# Patient Record
Sex: MALE | Race: BLACK OR AFRICAN AMERICAN | NOT HISPANIC OR LATINO | URBAN - METROPOLITAN AREA
[De-identification: names, ages, dates, MRNs, and addresses within clinical notes are randomized per-mention and may not be internally consistent; named-entity substitution may affect disease eponyms.]

---

## 2018-05-05 ENCOUNTER — EMERGENCY (EMERGENCY)
Facility: HOSPITAL | Age: 33
LOS: 1 days | Discharge: HOME | End: 2018-05-05
Admitting: EMERGENCY MEDICINE
Payer: COMMERCIAL

## 2018-05-05 VITALS
HEART RATE: 87 BPM | TEMPERATURE: 98 F | OXYGEN SATURATION: 100 % | RESPIRATION RATE: 16 BRPM | DIASTOLIC BLOOD PRESSURE: 76 MMHG | SYSTOLIC BLOOD PRESSURE: 129 MMHG

## 2018-05-05 VITALS
SYSTOLIC BLOOD PRESSURE: 123 MMHG | HEART RATE: 70 BPM | DIASTOLIC BLOOD PRESSURE: 80 MMHG | TEMPERATURE: 98 F | RESPIRATION RATE: 18 BRPM | OXYGEN SATURATION: 100 %

## 2018-05-05 LAB
ALBUMIN SERPL ELPH-MCNC: 3.7 G/DL — SIGNIFICANT CHANGE UP (ref 3.3–5)
ALP SERPL-CCNC: 159 U/L — HIGH (ref 40–120)
ALT FLD-CCNC: 58 U/L — HIGH (ref 4–41)
AST SERPL-CCNC: 37 U/L — SIGNIFICANT CHANGE UP (ref 4–40)
BASOPHILS # BLD AUTO: 0.1 K/UL — SIGNIFICANT CHANGE UP (ref 0–0.2)
BASOPHILS NFR BLD AUTO: 0.8 % — SIGNIFICANT CHANGE UP (ref 0–2)
BILIRUB SERPL-MCNC: < 0.2 MG/DL — LOW (ref 0.2–1.2)
BUN SERPL-MCNC: 13 MG/DL — SIGNIFICANT CHANGE UP (ref 7–23)
CALCIUM SERPL-MCNC: 9.3 MG/DL — SIGNIFICANT CHANGE UP (ref 8.4–10.5)
CHLORIDE SERPL-SCNC: 102 MMOL/L — SIGNIFICANT CHANGE UP (ref 98–107)
CO2 SERPL-SCNC: 27 MMOL/L — SIGNIFICANT CHANGE UP (ref 22–31)
CREAT SERPL-MCNC: 1.11 MG/DL — SIGNIFICANT CHANGE UP (ref 0.5–1.3)
EOSINOPHIL # BLD AUTO: 0.88 K/UL — HIGH (ref 0–0.5)
EOSINOPHIL NFR BLD AUTO: 7.4 % — HIGH (ref 0–6)
GLUCOSE SERPL-MCNC: 96 MG/DL — SIGNIFICANT CHANGE UP (ref 70–99)
HCT VFR BLD CALC: 42.6 % — SIGNIFICANT CHANGE UP (ref 39–50)
HGB BLD-MCNC: 13.8 G/DL — SIGNIFICANT CHANGE UP (ref 13–17)
IMM GRANULOCYTES # BLD AUTO: 0.18 # — SIGNIFICANT CHANGE UP
IMM GRANULOCYTES NFR BLD AUTO: 1.5 % — SIGNIFICANT CHANGE UP (ref 0–1.5)
LYMPHOCYTES # BLD AUTO: 36.2 % — SIGNIFICANT CHANGE UP (ref 13–44)
LYMPHOCYTES # BLD AUTO: 4.3 K/UL — HIGH (ref 1–3.3)
MCHC RBC-ENTMCNC: 25.2 PG — LOW (ref 27–34)
MCHC RBC-ENTMCNC: 32.4 % — SIGNIFICANT CHANGE UP (ref 32–36)
MCV RBC AUTO: 77.9 FL — LOW (ref 80–100)
MONOCYTES # BLD AUTO: 0.75 K/UL — SIGNIFICANT CHANGE UP (ref 0–0.9)
MONOCYTES NFR BLD AUTO: 6.3 % — SIGNIFICANT CHANGE UP (ref 2–14)
NEUTROPHILS # BLD AUTO: 5.67 K/UL — SIGNIFICANT CHANGE UP (ref 1.8–7.4)
NEUTROPHILS NFR BLD AUTO: 47.8 % — SIGNIFICANT CHANGE UP (ref 43–77)
NRBC # FLD: 0 — SIGNIFICANT CHANGE UP
OB PNL STL: NEGATIVE — SIGNIFICANT CHANGE UP
PLATELET # BLD AUTO: 270 K/UL — SIGNIFICANT CHANGE UP (ref 150–400)
PMV BLD: 10 FL — SIGNIFICANT CHANGE UP (ref 7–13)
POTASSIUM SERPL-MCNC: 4.4 MMOL/L — SIGNIFICANT CHANGE UP (ref 3.5–5.3)
POTASSIUM SERPL-SCNC: 4.4 MMOL/L — SIGNIFICANT CHANGE UP (ref 3.5–5.3)
PROT SERPL-MCNC: 6.8 G/DL — SIGNIFICANT CHANGE UP (ref 6–8.3)
RBC # BLD: 5.47 M/UL — SIGNIFICANT CHANGE UP (ref 4.2–5.8)
RBC # FLD: 14.5 % — SIGNIFICANT CHANGE UP (ref 10.3–14.5)
SODIUM SERPL-SCNC: 141 MMOL/L — SIGNIFICANT CHANGE UP (ref 135–145)
WBC # BLD: 11.88 K/UL — HIGH (ref 3.8–10.5)
WBC # FLD AUTO: 11.88 K/UL — HIGH (ref 3.8–10.5)

## 2018-05-05 PROCEDURE — 99284 EMERGENCY DEPT VISIT MOD MDM: CPT

## 2018-05-05 PROCEDURE — 74177 CT ABD & PELVIS W/CONTRAST: CPT | Mod: 26

## 2018-05-05 RX ORDER — OMEPRAZOLE 10 MG/1
0 CAPSULE, DELAYED RELEASE ORAL
Qty: 0 | Refills: 0 | COMMUNITY

## 2018-05-05 RX ORDER — SODIUM CHLORIDE 9 MG/ML
1000 INJECTION INTRAMUSCULAR; INTRAVENOUS; SUBCUTANEOUS ONCE
Qty: 0 | Refills: 0 | Status: COMPLETED | OUTPATIENT
Start: 2018-05-05 | End: 2018-05-05

## 2018-05-05 RX ADMIN — SODIUM CHLORIDE 1000 MILLILITER(S): 9 INJECTION INTRAMUSCULAR; INTRAVENOUS; SUBCUTANEOUS at 19:10

## 2018-05-05 NOTE — ED ADULT NURSE NOTE - CHIEF COMPLAINT
The patient is a 32y Male complaining of The patient is a 32y Male complaining of abdominal pain and rectal bleeding

## 2018-05-05 NOTE — ED ADULT NURSE NOTE - CHIEF COMPLAINT QUOTE
Pt c/o rectal Bleed , abd discomfort, weakness since yesterday and dizziness today.  pt had a similar episode of dizziness a few weeks ago with low BP

## 2018-05-05 NOTE — ED PROVIDER NOTE - OBJECTIVE STATEMENT
33 y/o male no pmh c/o abd pain in epigastric to left side of adb saw GI for abd pain and is to have endoscopy on 5/14. He developed rectal bleeding yesterday and states blood is on his stool and in toilet bowl. Denies fever/chills, n/v/d, rectal pain, hemorrhoids. States he has been feeling lightheaded this afternoon.

## 2019-03-15 ENCOUNTER — EMERGENCY (EMERGENCY)
Facility: HOSPITAL | Age: 34
LOS: 1 days | Discharge: HOME | End: 2019-03-15
Attending: EMERGENCY MEDICINE | Admitting: EMERGENCY MEDICINE
Payer: COMMERCIAL

## 2019-03-15 VITALS
OXYGEN SATURATION: 97 % | RESPIRATION RATE: 26 BRPM | HEART RATE: 117 BPM | TEMPERATURE: 101 F | SYSTOLIC BLOOD PRESSURE: 133 MMHG | DIASTOLIC BLOOD PRESSURE: 77 MMHG

## 2019-03-15 VITALS
RESPIRATION RATE: 18 BRPM | OXYGEN SATURATION: 100 % | HEART RATE: 91 BPM | TEMPERATURE: 99 F | SYSTOLIC BLOOD PRESSURE: 108 MMHG | DIASTOLIC BLOOD PRESSURE: 54 MMHG

## 2019-03-15 LAB
ALBUMIN SERPL ELPH-MCNC: 3.8 G/DL — SIGNIFICANT CHANGE UP (ref 3.3–5)
ALP SERPL-CCNC: 150 U/L — HIGH (ref 40–120)
ALT FLD-CCNC: 25 U/L — SIGNIFICANT CHANGE UP (ref 4–41)
ANION GAP SERPL CALC-SCNC: 15 MMO/L — HIGH (ref 7–14)
APPEARANCE UR: CLEAR — SIGNIFICANT CHANGE UP
AST SERPL-CCNC: 21 U/L — SIGNIFICANT CHANGE UP (ref 4–40)
BASE EXCESS BLDV CALC-SCNC: 4.1 MMOL/L — SIGNIFICANT CHANGE UP
BASOPHILS # BLD AUTO: 0.05 K/UL — SIGNIFICANT CHANGE UP (ref 0–0.2)
BASOPHILS NFR BLD AUTO: 0.5 % — SIGNIFICANT CHANGE UP (ref 0–2)
BILIRUB SERPL-MCNC: 0.4 MG/DL — SIGNIFICANT CHANGE UP (ref 0.2–1.2)
BILIRUB UR-MCNC: NEGATIVE — SIGNIFICANT CHANGE UP
BLOOD GAS VENOUS - CREATININE: 0.96 MG/DL — SIGNIFICANT CHANGE UP (ref 0.5–1.3)
BLOOD UR QL VISUAL: NEGATIVE — SIGNIFICANT CHANGE UP
BUN SERPL-MCNC: 12 MG/DL — SIGNIFICANT CHANGE UP (ref 7–23)
CALCIUM SERPL-MCNC: 9.1 MG/DL — SIGNIFICANT CHANGE UP (ref 8.4–10.5)
CHLORIDE BLDV-SCNC: 108 MMOL/L — SIGNIFICANT CHANGE UP (ref 96–108)
CHLORIDE SERPL-SCNC: 101 MMOL/L — SIGNIFICANT CHANGE UP (ref 98–107)
CO2 SERPL-SCNC: 22 MMOL/L — SIGNIFICANT CHANGE UP (ref 22–31)
COLOR SPEC: YELLOW — SIGNIFICANT CHANGE UP
CREAT SERPL-MCNC: 0.99 MG/DL — SIGNIFICANT CHANGE UP (ref 0.5–1.3)
EOSINOPHIL # BLD AUTO: 0.13 K/UL — SIGNIFICANT CHANGE UP (ref 0–0.5)
EOSINOPHIL NFR BLD AUTO: 1.2 % — SIGNIFICANT CHANGE UP (ref 0–6)
GAS PNL BLDV: 133 MMOL/L — LOW (ref 136–146)
GLUCOSE BLDV-MCNC: 113 — HIGH (ref 70–99)
GLUCOSE SERPL-MCNC: 107 MG/DL — HIGH (ref 70–99)
GLUCOSE UR-MCNC: NEGATIVE — SIGNIFICANT CHANGE UP
HCO3 BLDV-SCNC: 27 MMOL/L — SIGNIFICANT CHANGE UP (ref 20–27)
HCT VFR BLD CALC: 46.9 % — SIGNIFICANT CHANGE UP (ref 39–50)
HCT VFR BLDV CALC: 45.9 % — SIGNIFICANT CHANGE UP (ref 39–51)
HGB BLD-MCNC: 14.2 G/DL — SIGNIFICANT CHANGE UP (ref 13–17)
HGB BLDV-MCNC: 15 G/DL — SIGNIFICANT CHANGE UP (ref 13–17)
IMM GRANULOCYTES NFR BLD AUTO: 1 % — SIGNIFICANT CHANGE UP (ref 0–1.5)
KETONES UR-MCNC: NEGATIVE — SIGNIFICANT CHANGE UP
LACTATE BLDV-MCNC: 1.7 MMOL/L — SIGNIFICANT CHANGE UP (ref 0.5–2)
LEUKOCYTE ESTERASE UR-ACNC: NEGATIVE — SIGNIFICANT CHANGE UP
LIDOCAIN IGE QN: 19.6 U/L — SIGNIFICANT CHANGE UP (ref 7–60)
LYMPHOCYTES # BLD AUTO: 2.25 K/UL — SIGNIFICANT CHANGE UP (ref 1–3.3)
LYMPHOCYTES # BLD AUTO: 21.6 % — SIGNIFICANT CHANGE UP (ref 13–44)
MCHC RBC-ENTMCNC: 23.6 PG — LOW (ref 27–34)
MCHC RBC-ENTMCNC: 30.3 % — LOW (ref 32–36)
MCV RBC AUTO: 77.9 FL — LOW (ref 80–100)
MONOCYTES # BLD AUTO: 0.83 K/UL — SIGNIFICANT CHANGE UP (ref 0–0.9)
MONOCYTES NFR BLD AUTO: 8 % — SIGNIFICANT CHANGE UP (ref 2–14)
NEUTROPHILS # BLD AUTO: 7.06 K/UL — SIGNIFICANT CHANGE UP (ref 1.8–7.4)
NEUTROPHILS NFR BLD AUTO: 67.7 % — SIGNIFICANT CHANGE UP (ref 43–77)
NITRITE UR-MCNC: NEGATIVE — SIGNIFICANT CHANGE UP
NRBC # FLD: 0 K/UL — LOW (ref 25–125)
PCO2 BLDV: 46 MMHG — SIGNIFICANT CHANGE UP (ref 41–51)
PH BLDV: 7.41 PH — SIGNIFICANT CHANGE UP (ref 7.32–7.43)
PH UR: 6 — SIGNIFICANT CHANGE UP (ref 5–8)
PLATELET # BLD AUTO: 280 K/UL — SIGNIFICANT CHANGE UP (ref 150–400)
PMV BLD: 9.7 FL — SIGNIFICANT CHANGE UP (ref 7–13)
PO2 BLDV: 39 MMHG — SIGNIFICANT CHANGE UP (ref 35–40)
POTASSIUM BLDV-SCNC: 3.4 MMOL/L — SIGNIFICANT CHANGE UP (ref 3.4–4.5)
POTASSIUM SERPL-MCNC: 3.7 MMOL/L — SIGNIFICANT CHANGE UP (ref 3.5–5.3)
POTASSIUM SERPL-SCNC: 3.7 MMOL/L — SIGNIFICANT CHANGE UP (ref 3.5–5.3)
PROT SERPL-MCNC: 7.4 G/DL — SIGNIFICANT CHANGE UP (ref 6–8.3)
PROT UR-MCNC: 30 — SIGNIFICANT CHANGE UP
RBC # BLD: 6.02 M/UL — HIGH (ref 4.2–5.8)
RBC # FLD: 14.3 % — SIGNIFICANT CHANGE UP (ref 10.3–14.5)
SAO2 % BLDV: 71.2 % — SIGNIFICANT CHANGE UP (ref 60–85)
SODIUM SERPL-SCNC: 138 MMOL/L — SIGNIFICANT CHANGE UP (ref 135–145)
SP GR SPEC: 1.02 — SIGNIFICANT CHANGE UP (ref 1–1.04)
URATE SERPL-MCNC: 8.9 MG/DL — HIGH (ref 3.4–8.8)
UROBILINOGEN FLD QL: NORMAL — SIGNIFICANT CHANGE UP
WBC # BLD: 10.42 K/UL — SIGNIFICANT CHANGE UP (ref 3.8–10.5)
WBC # FLD AUTO: 10.42 K/UL — SIGNIFICANT CHANGE UP (ref 3.8–10.5)
WBC UR QL: SIGNIFICANT CHANGE UP (ref 0–?)

## 2019-03-15 PROCEDURE — 73610 X-RAY EXAM OF ANKLE: CPT | Mod: 26,RT

## 2019-03-15 PROCEDURE — 99284 EMERGENCY DEPT VISIT MOD MDM: CPT

## 2019-03-15 RX ORDER — COLCHICINE 0.6 MG
1 TABLET ORAL
Qty: 16 | Refills: 0 | OUTPATIENT
Start: 2019-03-15 | End: 2019-03-18

## 2019-03-15 RX ORDER — FAMOTIDINE 10 MG/ML
1 INJECTION INTRAVENOUS
Qty: 14 | Refills: 0 | OUTPATIENT
Start: 2019-03-15 | End: 2019-03-28

## 2019-03-15 RX ORDER — ACETAMINOPHEN 500 MG
1000 TABLET ORAL ONCE
Qty: 0 | Refills: 0 | Status: DISCONTINUED | OUTPATIENT
Start: 2019-03-15 | End: 2019-03-15

## 2019-03-15 RX ORDER — ONDANSETRON 8 MG/1
1 TABLET, FILM COATED ORAL
Qty: 15 | Refills: 0 | OUTPATIENT
Start: 2019-03-15 | End: 2019-03-19

## 2019-03-15 RX ORDER — SODIUM CHLORIDE 9 MG/ML
2000 INJECTION INTRAMUSCULAR; INTRAVENOUS; SUBCUTANEOUS ONCE
Qty: 0 | Refills: 0 | Status: COMPLETED | OUTPATIENT
Start: 2019-03-15 | End: 2019-03-15

## 2019-03-15 RX ORDER — ONDANSETRON 8 MG/1
4 TABLET, FILM COATED ORAL ONCE
Qty: 0 | Refills: 0 | Status: COMPLETED | OUTPATIENT
Start: 2019-03-15 | End: 2019-03-15

## 2019-03-15 RX ORDER — KETOROLAC TROMETHAMINE 30 MG/ML
30 SYRINGE (ML) INJECTION ONCE
Qty: 0 | Refills: 0 | Status: DISCONTINUED | OUTPATIENT
Start: 2019-03-15 | End: 2019-03-15

## 2019-03-15 RX ORDER — FAMOTIDINE 10 MG/ML
20 INJECTION INTRAVENOUS ONCE
Qty: 0 | Refills: 0 | Status: COMPLETED | OUTPATIENT
Start: 2019-03-15 | End: 2019-03-15

## 2019-03-15 RX ORDER — SODIUM CHLORIDE 9 MG/ML
1000 INJECTION INTRAMUSCULAR; INTRAVENOUS; SUBCUTANEOUS ONCE
Qty: 0 | Refills: 0 | Status: COMPLETED | OUTPATIENT
Start: 2019-03-15 | End: 2019-03-15

## 2019-03-15 RX ADMIN — SODIUM CHLORIDE 1000 MILLILITER(S): 9 INJECTION INTRAMUSCULAR; INTRAVENOUS; SUBCUTANEOUS at 11:11

## 2019-03-15 RX ADMIN — SODIUM CHLORIDE 2000 MILLILITER(S): 9 INJECTION INTRAMUSCULAR; INTRAVENOUS; SUBCUTANEOUS at 14:47

## 2019-03-15 RX ADMIN — Medication 30 MILLIGRAM(S): at 11:10

## 2019-03-15 RX ADMIN — ONDANSETRON 4 MILLIGRAM(S): 8 TABLET, FILM COATED ORAL at 11:10

## 2019-03-15 RX ADMIN — Medication 40 MILLIGRAM(S): at 14:44

## 2019-03-15 RX ADMIN — SODIUM CHLORIDE 1000 MILLILITER(S): 9 INJECTION INTRAMUSCULAR; INTRAVENOUS; SUBCUTANEOUS at 14:44

## 2019-03-15 RX ADMIN — FAMOTIDINE 20 MILLIGRAM(S): 10 INJECTION INTRAVENOUS at 11:10

## 2019-03-15 NOTE — ED PROVIDER NOTE - NSFOLLOWUPINSTRUCTIONS_ED_ALL_ED_FT
Eat a bland diet -  Bananas, rice and toast. Take Zofran as needed every 8 hours for nausea/vomiting. Take Pepcid 40mg once daily in the morning before eating. Take Prednisone 40mg once daily for 5 days for gout flair. Take Colchicine 1 tablet every 6 hours as needed for gout. Please continue all home medications as directed. See your regular doctor/podiatrist within 72 hours for follow-up care. Return to ER for new or worsening symptoms.

## 2019-03-15 NOTE — ED PROVIDER NOTE - PROGRESS NOTE DETAILS
Pt NAD, VSS, no acute complaints. Pt tolerating PO. Discussed the results of the labs and imaging. PE: abdomen - soft, nontender, nondistended.

## 2019-03-15 NOTE — ED ADULT NURSE NOTE - OBJECTIVE STATEMENT
Pt reports multiple medical complaints with abd pain, fever/chills and right ankle pain. Patient appears NAD, pt states he is unable to ambulate d/t pain. Patient's right foot appears swollen. Patient's labs drawn, septic work-up done. #20g placed into right AC. Patient receiving 2L IVF. Patient pending XR.

## 2019-03-15 NOTE — ED PROVIDER NOTE - ATTENDING CONTRIBUTION TO CARE
I agree with the above H&P.  Briefly this is a 33 year old with n/v/d. concern for gastroenteritis. belly soft. will check labs ua lipase. patient with hx of gout will check ua.  ct if focal pain develops or symptoms don't imrpveo with antiemetics

## 2019-03-15 NOTE — ED PROVIDER NOTE - OBJECTIVE STATEMENT
33 year old male with a PMHx of gout pw N/V/D, epigastric pain and fevers since last night. He has had 9 episodes of NB/NB vomiting and 4-5 episodes of nonbloody diarrhea. His wife, child, and other family member living in his home have had similar symptoms during the past week. Traveled to [place] on 2/17. Also states that he ate a taco last night which caused a flair up in his gout on his right ankle and is having difficulty bearing weight on the ankle. Denies CP, SOB, dysuria, hematuria, melena, hematochezia.

## 2019-03-15 NOTE — ED PROVIDER NOTE - NONTENDER LOCATION
suprapubic/periumbilical/left costovertebral angle/left lower quadrant/right costovertebral angle/left upper quadrant/right upper quadrant/right lower quadrant/umbilical

## 2019-03-15 NOTE — ED PROCEDURE NOTE - CPROC ED POST PROC CARE GUIDE1
Elevate the injured extremity as instructed./Instructed patient/caregiver regarding signs and symptoms of infection./Keep the cast/splint/dressing clean and dry./Verbal/written post procedure instructions were given to patient/caregiver./Instructed patient/caregiver to follow-up with primary care physician.

## 2019-03-15 NOTE — ED PROVIDER NOTE - CLINICAL SUMMARY MEDICAL DECISION MAKING FREE TEXT BOX
33 year old male with a PMHx of gout pw N/V/D, epigastric pain and fevers since last night. He has had 9 episodes of NB/NB vomiting and 4-5 episodes of nonbloody diarrhea  Assessment: abdomen nontender - most likely gastroenteritis  Plan: ivf, antiemetics, pain management, labs, UA, xray ankle

## 2019-03-16 LAB
SPECIMEN SOURCE: SIGNIFICANT CHANGE UP

## 2019-03-17 LAB — BACTERIA UR CULT: SIGNIFICANT CHANGE UP

## 2019-03-20 LAB
BACTERIA BLD CULT: SIGNIFICANT CHANGE UP
BACTERIA BLD CULT: SIGNIFICANT CHANGE UP

## 2019-04-29 ENCOUNTER — EMERGENCY (EMERGENCY)
Facility: HOSPITAL | Age: 34
LOS: 1 days | Discharge: HOME | End: 2019-04-29
Attending: EMERGENCY MEDICINE | Admitting: EMERGENCY MEDICINE
Payer: COMMERCIAL

## 2019-04-29 VITALS
TEMPERATURE: 100 F | OXYGEN SATURATION: 98 % | HEART RATE: 135 BPM | RESPIRATION RATE: 20 BRPM | DIASTOLIC BLOOD PRESSURE: 73 MMHG | SYSTOLIC BLOOD PRESSURE: 131 MMHG

## 2019-04-29 LAB
ALBUMIN SERPL ELPH-MCNC: 4.1 G/DL — SIGNIFICANT CHANGE UP (ref 3.3–5)
ALP SERPL-CCNC: 146 U/L — HIGH (ref 40–120)
ALT FLD-CCNC: 22 U/L — SIGNIFICANT CHANGE UP (ref 4–41)
ANION GAP SERPL CALC-SCNC: 13 MMO/L — SIGNIFICANT CHANGE UP (ref 7–14)
APTT BLD: 36 SEC — SIGNIFICANT CHANGE UP (ref 27.5–36.3)
AST SERPL-CCNC: 15 U/L — SIGNIFICANT CHANGE UP (ref 4–40)
BASE EXCESS BLDV CALC-SCNC: 2.4 MMOL/L — SIGNIFICANT CHANGE UP
BASOPHILS # BLD AUTO: 0.1 K/UL — SIGNIFICANT CHANGE UP (ref 0–0.2)
BASOPHILS NFR BLD AUTO: 0.7 % — SIGNIFICANT CHANGE UP (ref 0–2)
BILIRUB SERPL-MCNC: 0.5 MG/DL — SIGNIFICANT CHANGE UP (ref 0.2–1.2)
BLOOD GAS VENOUS - CREATININE: 1.03 MG/DL — SIGNIFICANT CHANGE UP (ref 0.5–1.3)
BUN SERPL-MCNC: 12 MG/DL — SIGNIFICANT CHANGE UP (ref 7–23)
CALCIUM SERPL-MCNC: 9.2 MG/DL — SIGNIFICANT CHANGE UP (ref 8.4–10.5)
CHLORIDE BLDV-SCNC: 103 MMOL/L — SIGNIFICANT CHANGE UP (ref 96–108)
CHLORIDE SERPL-SCNC: 99 MMOL/L — SIGNIFICANT CHANGE UP (ref 98–107)
CK MB BLD-MCNC: < 1 NG/ML — LOW (ref 1–6.6)
CK MB BLD-MCNC: SIGNIFICANT CHANGE UP (ref 0–2.5)
CK SERPL-CCNC: 79 U/L — SIGNIFICANT CHANGE UP (ref 30–200)
CO2 SERPL-SCNC: 25 MMOL/L — SIGNIFICANT CHANGE UP (ref 22–31)
CREAT SERPL-MCNC: 1.21 MG/DL — SIGNIFICANT CHANGE UP (ref 0.5–1.3)
EOSINOPHIL # BLD AUTO: 0.02 K/UL — SIGNIFICANT CHANGE UP (ref 0–0.5)
EOSINOPHIL NFR BLD AUTO: 0.1 % — SIGNIFICANT CHANGE UP (ref 0–6)
GAS PNL BLDV: 136 MMOL/L — SIGNIFICANT CHANGE UP (ref 136–146)
GLUCOSE BLDV-MCNC: 108 — HIGH (ref 70–99)
GLUCOSE SERPL-MCNC: 113 MG/DL — HIGH (ref 70–99)
HCO3 BLDV-SCNC: 26 MMOL/L — SIGNIFICANT CHANGE UP (ref 20–27)
HCT VFR BLD CALC: 44.8 % — SIGNIFICANT CHANGE UP (ref 39–50)
HCT VFR BLDV CALC: 45.2 % — SIGNIFICANT CHANGE UP (ref 39–51)
HGB BLD-MCNC: 14.3 G/DL — SIGNIFICANT CHANGE UP (ref 13–17)
HGB BLDV-MCNC: 14.7 G/DL — SIGNIFICANT CHANGE UP (ref 13–17)
IMM GRANULOCYTES NFR BLD AUTO: 0.8 % — SIGNIFICANT CHANGE UP (ref 0–1.5)
INR BLD: 1.44 — HIGH (ref 0.88–1.17)
LACTATE BLDV-MCNC: 1.2 MMOL/L — SIGNIFICANT CHANGE UP (ref 0.5–2)
LYMPHOCYTES # BLD AUTO: 14.5 % — SIGNIFICANT CHANGE UP (ref 13–44)
LYMPHOCYTES # BLD AUTO: 2.19 K/UL — SIGNIFICANT CHANGE UP (ref 1–3.3)
MCHC RBC-ENTMCNC: 24 PG — LOW (ref 27–34)
MCHC RBC-ENTMCNC: 31.9 % — LOW (ref 32–36)
MCV RBC AUTO: 75.3 FL — LOW (ref 80–100)
MONOCYTES # BLD AUTO: 1.04 K/UL — HIGH (ref 0–0.9)
MONOCYTES NFR BLD AUTO: 6.9 % — SIGNIFICANT CHANGE UP (ref 2–14)
NEUTROPHILS # BLD AUTO: 11.68 K/UL — HIGH (ref 1.8–7.4)
NEUTROPHILS NFR BLD AUTO: 77 % — SIGNIFICANT CHANGE UP (ref 43–77)
NRBC # FLD: 0 K/UL — SIGNIFICANT CHANGE UP (ref 0–0)
PCO2 BLDV: 41 MMHG — SIGNIFICANT CHANGE UP (ref 41–51)
PH BLDV: 7.43 PH — SIGNIFICANT CHANGE UP (ref 7.32–7.43)
PLATELET # BLD AUTO: 291 K/UL — SIGNIFICANT CHANGE UP (ref 150–400)
PMV BLD: 10.1 FL — SIGNIFICANT CHANGE UP (ref 7–13)
PO2 BLDV: 35 MMHG — SIGNIFICANT CHANGE UP (ref 35–40)
POTASSIUM BLDV-SCNC: 3.4 MMOL/L — SIGNIFICANT CHANGE UP (ref 3.4–4.5)
POTASSIUM SERPL-MCNC: 3.5 MMOL/L — SIGNIFICANT CHANGE UP (ref 3.5–5.3)
POTASSIUM SERPL-SCNC: 3.5 MMOL/L — SIGNIFICANT CHANGE UP (ref 3.5–5.3)
PROT SERPL-MCNC: 7.5 G/DL — SIGNIFICANT CHANGE UP (ref 6–8.3)
PROTHROM AB SERPL-ACNC: 16.6 SEC — HIGH (ref 9.8–13.1)
RBC # BLD: 5.95 M/UL — HIGH (ref 4.2–5.8)
RBC # FLD: 14.6 % — HIGH (ref 10.3–14.5)
SAO2 % BLDV: 67.7 % — SIGNIFICANT CHANGE UP (ref 60–85)
SODIUM SERPL-SCNC: 137 MMOL/L — SIGNIFICANT CHANGE UP (ref 135–145)
TROPONIN T, HIGH SENSITIVITY: < 6 NG/L — SIGNIFICANT CHANGE UP (ref ?–14)
WBC # BLD: 15.15 K/UL — HIGH (ref 3.8–10.5)
WBC # FLD AUTO: 15.15 K/UL — HIGH (ref 3.8–10.5)

## 2019-04-29 PROCEDURE — 99284 EMERGENCY DEPT VISIT MOD MDM: CPT | Mod: 25

## 2019-04-29 PROCEDURE — 93010 ELECTROCARDIOGRAM REPORT: CPT

## 2019-04-29 PROCEDURE — 71046 X-RAY EXAM CHEST 2 VIEWS: CPT | Mod: 26

## 2019-04-29 RX ORDER — SODIUM CHLORIDE 9 MG/ML
2000 INJECTION, SOLUTION INTRAVENOUS ONCE
Qty: 0 | Refills: 0 | Status: COMPLETED | OUTPATIENT
Start: 2019-04-29 | End: 2019-04-29

## 2019-04-29 RX ORDER — KETOROLAC TROMETHAMINE 30 MG/ML
30 SYRINGE (ML) INJECTION ONCE
Qty: 0 | Refills: 0 | Status: DISCONTINUED | OUTPATIENT
Start: 2019-04-29 | End: 2019-04-29

## 2019-04-29 RX ORDER — CEFTRIAXONE 500 MG/1
1 INJECTION, POWDER, FOR SOLUTION INTRAMUSCULAR; INTRAVENOUS ONCE
Qty: 0 | Refills: 0 | Status: COMPLETED | OUTPATIENT
Start: 2019-04-29 | End: 2019-04-29

## 2019-04-29 RX ADMIN — SODIUM CHLORIDE 2000 MILLILITER(S): 9 INJECTION, SOLUTION INTRAVENOUS at 22:30

## 2019-04-29 RX ADMIN — CEFTRIAXONE 100 GRAM(S): 500 INJECTION, POWDER, FOR SOLUTION INTRAMUSCULAR; INTRAVENOUS at 23:21

## 2019-04-29 RX ADMIN — Medication 30 MILLIGRAM(S): at 22:30

## 2019-04-29 NOTE — ED PROVIDER NOTE - ATTENDING CONTRIBUTION TO CARE
Attending note:   After face to face evaluation of this patient, I concur with above noted hx, pe, and care plan for this patient.  32 y/o M with fever, chills, cough for several days, seen elsewhere dx'd with pneumonia and flu and discharged.  No med hx, patient non-toxic appearing, nad.     Evaluation in progress

## 2019-04-29 NOTE — ED ADULT NURSE NOTE - OBJECTIVE STATEMENT
Pt received to room 9 a/o x 3 c/o SOB, fever, chills, Productive cough, numbness to bilateral toes, midsternal chest pain that radiated to his ribs that started at 4pm. Pt was seen at [facility] ED and diagnosed with flu and DCed home. Pt states symptoms started after being DCed from ed. Respirations even and unlabored. Lung sounds clear with equal chest rise bilaterally. ABD is soft, non tender, non distended with normal active bowel sounds No complaints of  nausea, dizziness, vomiting  verbalized. Pt placed oncardiac monitor in sinus tach, 18g iv placed to right AC. labs drawn and sent. Medications and fluids given as per order.

## 2019-04-29 NOTE — ED ADULT NURSE NOTE - CHIEF COMPLAINT QUOTE
pt c/o sob and chest pain for few hours. states he just left [facility] and was dx with flu and CAP when symptoms began. endorses dizziness and chills, denies medical hx

## 2019-04-29 NOTE — ED PROVIDER NOTE - PRIOR EKG STATUS
the EKG is unchanged from prior EKG/aside from JACKELIN in V2,  no changes from prior EKG 5/5/18 16:10

## 2019-04-29 NOTE — ED PROVIDER NOTE - PROGRESS NOTE DETAILS
feeling better, tolerating PO. labs and imaging unremarkable. informed him that he has neither pneumonia nor influenza. does have enterovirus. advised to stop taking oseltamivir. will d/c and advise PMD f/u feeling better, tolerating PO. labs and imaging unremarkable. pt notes he has seen cardiology in past but cannot recall specific details, cardiac labs negative. informed him that he has neither pneumonia nor influenza. does have enterovirus. advised to stop taking oseltamivir. will d/c and advise PMD f/u

## 2019-04-29 NOTE — ED PROVIDER NOTE - OBJECTIVE STATEMENT
33M presenting w/ shortness of breath. Notes fever, chills, fatigue, myalgias, starting earlier this morning, was in [place] and went to [facility] ED, diagnosed w/ influenza and community PNA there, d/c'd home w/ azithromycin and oseltamivir. After being discharged, around 6pm, developed intermittent dyspnea and chest pain w/ productive cough and nasal congestion and came back to this ED. Notes daughter at home has similar but milder symptoms.

## 2019-04-29 NOTE — ED ADULT TRIAGE NOTE - CHIEF COMPLAINT QUOTE
pt c/o sob and chest pain for few hours. states he just left [facility] and was dx with flu and CAP when symptoms began. endorses dizziness and chills pt c/o sob and chest pain for few hours. states he just left [facility] and was dx with flu and CAP when symptoms began. endorses dizziness and chills, denies medical hx

## 2019-04-29 NOTE — ED PROVIDER NOTE - NSFOLLOWUPINSTRUCTIONS_ED_ALL_ED_FT
Take Tylenol (or acetaminophen) 650mg every 4 hours and Motrin (or Advil or ibuprofen) 400mg every 6 hours with food or milk as needed for pain/discomfort/swelling. Never take more than 3000mg of Tylenol/acetaminophen in any 24 hour period. If your stomach bothers you after taking Motrin/Advil/ibuprofen, you can use tums or Pepcid or Zantac or Maalox (these can all be bought over the counter in any pharmacy).

## 2019-04-29 NOTE — ED PROVIDER NOTE - CLINICAL SUMMARY MEDICAL DECISION MAKING FREE TEXT BOX
33M w/ above history p/w fever, fatigue, myalgias, cough, dyspnea, chest pain, seen at outside ED and diagnosed w/ flu, community PNA earlier today, returning w/ worsening symptoms and abnormal ekg, concerning for PNA + flu, roz/pericarditis  -labs, xr, abx, admit 33M w/ above history p/w fever, fatigue, myalgias, cough, dyspnea, chest pain, seen at outside ED and reportedly diagnosed w/ flu, community PNA earlier today, returning w/ worsening symptoms and abnormal ekg, concerning for PNA + flu, roz/pericarditis  -labs, xr, abx, reassess 33M w/ above history p/w fever, fatigue, myalgias, cough, dyspnea, chest pain, seen at outside ED and reportedly diagnosed w/ flu, community PNA earlier today, returning w/ worsening symptoms, concerning for PNA + flu, roz/pericarditis  -labs, xr, abx, reassess

## 2019-04-30 VITALS — HEART RATE: 91 BPM

## 2019-04-30 PROBLEM — M10.9 GOUT, UNSPECIFIED: Chronic | Status: ACTIVE | Noted: 2019-03-15

## 2019-04-30 LAB
APPEARANCE UR: CLEAR — SIGNIFICANT CHANGE UP
B PERT DNA SPEC QL NAA+PROBE: NOT DETECTED — SIGNIFICANT CHANGE UP
BILIRUB UR-MCNC: NEGATIVE — SIGNIFICANT CHANGE UP
BLOOD UR QL VISUAL: NEGATIVE — SIGNIFICANT CHANGE UP
C PNEUM DNA SPEC QL NAA+PROBE: NOT DETECTED — SIGNIFICANT CHANGE UP
COLOR SPEC: YELLOW — SIGNIFICANT CHANGE UP
FLUAV H1 2009 PAND RNA SPEC QL NAA+PROBE: NOT DETECTED — SIGNIFICANT CHANGE UP
FLUAV H1 RNA SPEC QL NAA+PROBE: NOT DETECTED — SIGNIFICANT CHANGE UP
FLUAV H3 RNA SPEC QL NAA+PROBE: NOT DETECTED — SIGNIFICANT CHANGE UP
FLUAV SUBTYP SPEC NAA+PROBE: NOT DETECTED — SIGNIFICANT CHANGE UP
FLUBV RNA SPEC QL NAA+PROBE: NOT DETECTED — SIGNIFICANT CHANGE UP
GLUCOSE UR-MCNC: NEGATIVE — SIGNIFICANT CHANGE UP
HADV DNA SPEC QL NAA+PROBE: NOT DETECTED — SIGNIFICANT CHANGE UP
HCOV PNL SPEC NAA+PROBE: SIGNIFICANT CHANGE UP
HMPV RNA SPEC QL NAA+PROBE: NOT DETECTED — SIGNIFICANT CHANGE UP
HPIV1 RNA SPEC QL NAA+PROBE: NOT DETECTED — SIGNIFICANT CHANGE UP
HPIV2 RNA SPEC QL NAA+PROBE: NOT DETECTED — SIGNIFICANT CHANGE UP
HPIV3 RNA SPEC QL NAA+PROBE: NOT DETECTED — SIGNIFICANT CHANGE UP
HPIV4 RNA SPEC QL NAA+PROBE: NOT DETECTED — SIGNIFICANT CHANGE UP
KETONES UR-MCNC: NEGATIVE — SIGNIFICANT CHANGE UP
LEUKOCYTE ESTERASE UR-ACNC: NEGATIVE — SIGNIFICANT CHANGE UP
NITRITE UR-MCNC: NEGATIVE — SIGNIFICANT CHANGE UP
PH UR: 6 — SIGNIFICANT CHANGE UP (ref 5–8)
PROT UR-MCNC: 10 — SIGNIFICANT CHANGE UP
RSV RNA SPEC QL NAA+PROBE: NOT DETECTED — SIGNIFICANT CHANGE UP
RV+EV RNA SPEC QL NAA+PROBE: DETECTED — HIGH
SP GR SPEC: 1.02 — SIGNIFICANT CHANGE UP (ref 1–1.04)
SPECIMEN SOURCE: SIGNIFICANT CHANGE UP
SPECIMEN SOURCE: SIGNIFICANT CHANGE UP
UROBILINOGEN FLD QL: NORMAL — SIGNIFICANT CHANGE UP

## 2019-04-30 RX ORDER — ACETAMINOPHEN 500 MG
975 TABLET ORAL ONCE
Qty: 0 | Refills: 0 | Status: COMPLETED | OUTPATIENT
Start: 2019-04-30 | End: 2019-04-30

## 2019-04-30 RX ADMIN — Medication 975 MILLIGRAM(S): at 00:15

## 2019-05-01 ENCOUNTER — EMERGENCY (EMERGENCY)
Facility: HOSPITAL | Age: 34
LOS: 1 days | Discharge: HOME | End: 2019-05-01
Attending: EMERGENCY MEDICINE | Admitting: EMERGENCY MEDICINE
Payer: COMMERCIAL

## 2019-05-01 VITALS
RESPIRATION RATE: 22 BRPM | HEART RATE: 101 BPM | TEMPERATURE: 100 F | DIASTOLIC BLOOD PRESSURE: 84 MMHG | SYSTOLIC BLOOD PRESSURE: 128 MMHG | OXYGEN SATURATION: 99 %

## 2019-05-01 VITALS
HEART RATE: 97 BPM | OXYGEN SATURATION: 100 % | DIASTOLIC BLOOD PRESSURE: 72 MMHG | RESPIRATION RATE: 17 BRPM | SYSTOLIC BLOOD PRESSURE: 112 MMHG

## 2019-05-01 LAB
BACTERIA UR CULT: SIGNIFICANT CHANGE UP
SPECIMEN SOURCE: SIGNIFICANT CHANGE UP

## 2019-05-01 PROCEDURE — 99283 EMERGENCY DEPT VISIT LOW MDM: CPT | Mod: 25

## 2019-05-01 RX ORDER — DEXAMETHASONE 0.5 MG/5ML
10 ELIXIR ORAL ONCE
Qty: 0 | Refills: 0 | Status: COMPLETED | OUTPATIENT
Start: 2019-05-01 | End: 2019-05-01

## 2019-05-01 RX ADMIN — Medication 10 MILLIGRAM(S): at 06:14

## 2019-05-01 NOTE — ED PROVIDER NOTE - OBJECTIVE STATEMENT
33m with PMH of Gout, recently seen here yesterday for URI s/s and before that seen at [facility] ED for similar presentation. He was to finish up his course of AB and follow-up with PCP, Pt had immediate appt with PCP who stated he had sore-throat and prescribed penicillin. Pt now back in the ED do to concern for SOB when sleeping, pt states he woke up from sleeping choking and gasping for air, this was concerning enough to bring robin to ED. overall reports his URI symptoms have improved.  Pt reports intermittent chest sharpness also.  reports liquid black stools. Headaches, 33m with PMH of Gout, recently seen here yesterday for URI s/s and before that seen at [facility] ED for similar presentation. He was to finish up his course of AB and follow-up with PCP, Pt had immediate appt with PCP who stated he had sore-throat and prescribed penicillin. Pt now back in the ED do to concern for SOB when sleeping, pt states he woke up from sleeping choking and gasping for air, this was concerning enough to bring robin to ED. overall reports his URI symptoms have improved.  Pt reports intermittent chest sharpness also.  reports liquid black stools. Headaches,  Klepfish: 33M PMH gout p/w sore throat. Pt w/ 3-4d of sore throat, odynophagia, productive cough, body aches, intermittent HA, hoarse voice, red eyes. Was evalauted in several EDs (yesterday in [facility] had WBC 18 other labs including lactate wnl, CXR wnl, RVP w/ +rhino/enterovirus) and pmd and currently on pcn for possible strep. Taking tylenol for pain. Came to ED bec over last 2 nights pt occasionally wakes up from sleep and feels SOB. Denies current SOB. Overall symptoms are improving - fevers improving, vice improving, cough is unchanged, sore throat is improving, eye redness is improving. Denies rash, recent travel, sick contacts, NVD, abd pain, urinary complaints, black/bloody stool (contrary to resident). Normal PO intake.

## 2019-05-01 NOTE — ED PROVIDER NOTE - CARE PLAN
Principal Discharge DX:	URI (upper respiratory infection)  Goal:	please continue with your medications as prescribed and follow-up with PCP  Assessment and plan of treatment:	For your cold syndrome, please continue to take your medications as prescribed and continue with supportive treatment (hydration, Tylenol, Advil) please make a f/u appt with your PCP within 2 weeks.

## 2019-05-01 NOTE — ED ADULT NURSE NOTE - OBJECTIVE STATEMENT
alert no acute distress c/o sore throat and 10/10 neck pain  states he has been having dark watery stools    has taken 1 dose of zithromax and 2 doses of pen VK

## 2019-05-01 NOTE — ED PROVIDER NOTE - PHYSICAL EXAMINATION
PHYSICAL EXAM:  GENERAL: NAD, well-developed  HEAD:  Atraumatic, Normocephalic  EYES: EOMI, PERRLA, conjunctiva and sclera clear  NECK: Supple, No JVD  CHEST/LUNG: Clear to auscultation bilaterally; No wheeze  HEART: Regular rate and rhythm; No murmurs, rubs, or gallops  ABDOMEN: Soft, Nontender, Nondistended; Bowel sounds present  EXTREMITIES:, No clubbing, cyanosis, or edema  PSYCH: AAOx3  NEUROLOGY: non-focal  SKIN: No rashes or lesions PHYSICAL EXAM:  GENERAL: NAD, well-developed  HEAD:  Atraumatic, Normocephalic  EYES: EOMI, PERRLA, conjunctiva and sclera clear  NECK: Supple, No JVD  CHEST/LUNG: Clear to auscultation bilaterally; No wheeze  HEART: Regular rate and rhythm; No murmurs, rubs, or gallops  ABDOMEN: Soft, Nontender, Nondistended; Bowel sounds present  EXTREMITIES:, No clubbing, cyanosis, or edema  PSYCH: AAOx3  NEUROLOGY: non-focal  SKIN: No rashes or lesions    Klepfish: eyes: very minimal b/l conjunctival injection. acuity grossly intact.  +b/l tonsillar hypertrophy w/ exudates. +minimal b/l cervical LAD. no stridor/drooling, voice slightly hoarse (per pt this is improving). very well appearing. neck FROM.

## 2019-05-01 NOTE — ED ADULT TRIAGE NOTE - CHIEF COMPLAINT QUOTE
pt has high fever, throat infection and cold symptoms since Monday and was seen at two different ERs, Pt was seen in this ER Monday night and was given Zithromax but pt went to his on PMD  and was told that he has a "bad throat infection" and was started on penicllin vk 500mg. Pt was told to come to ER if he felt worse and tonight he started having difficulty breathing.

## 2019-05-01 NOTE — ED PROVIDER NOTE - PLAN OF CARE
please continue with your medications as prescribed and follow-up with PCP For your cold syndrome, please continue to take your medications as prescribed and continue with supportive treatment (hydration, Tylenol, Advil) please make a f/u appt with your PCP within 2 weeks.

## 2019-05-01 NOTE — ED PROVIDER NOTE - ATTENDING CONTRIBUTION TO CARE
33M PMH gout p/w sore throat. Pt w/ 3-4d of sore throat, odynophagia, productive cough, body aches, intermittent HA, hoarse voice, red eyes. Was evalauted in several EDs (yesterday in [place] had WBC 18 other labs including lactate wnl, CXR wnl, RVP w/ +rhino/enterovirus) and pmd and currently on pcn for possible strep. Taking tylenol for pain. Overall symptoms are improving. Came to ED bec over last 2 nights pt occasionally wakes up from sleep and feels SOB. Currently no SOB. Vitals wnl, exam as above.  ddx: Likely URI. Clinically not myocarditis, RPA or PTA.   Log discussion w/ pt regarding expected course and reasons to come back to ED. Will give decardon for symptom control, comfortable for dc, continued outpt pmd f/u.

## 2019-05-01 NOTE — ED PROVIDER NOTE - NS ED ROS FT
REVIEW OF SYSTEMS:    CONSTITUTIONAL: As per HPI  EYES/ENT:As per HPI  NECK: No pain or stiffness  RESPIRATORY:  As per HPI  CARDIOVASCULAR: As per HPI   GASTROINTESTINAL: As per hPi  GENITOURINARY: No dysuria, frequency or hematuria.  NEUROLOGICAL: No numbness or weakness  SKIN: No itching, rashes  Psych- A&Ox3, normal affect

## 2019-05-04 LAB
BACTERIA BLD CULT: SIGNIFICANT CHANGE UP
BACTERIA BLD CULT: SIGNIFICANT CHANGE UP

## 2019-12-06 NOTE — ED PROVIDER NOTE - CPE EDP RESP NORM
SUBJECTIVE:     Ada Barnes is a 2 year old female, here for a routine health maintenance visit.    Patient was roomed by: Bere Hicks    Well Child     Social History  Patient accompanied by:  Mother  Questions or concerns?: No    Forms to complete? No  Child lives with::  Mother, father and brother  Who takes care of your child?:  Mother  Languages spoken in the home:  English  Recent family changes/ special stressors?:  OTHER*    Safety / Health Risk  Is your child around anyone who smokes?  No    TB Exposure:     No TB exposure    Car seat <6 years old, in back seat, 5-point restraint?  Yes  Bike or sport helmet for bike trailer or trike?  Yes    Home Safety Survey:      Stairs Gated?:  Yes     Wood stove / Fireplace screened?  Not applicable     Poisons / cleaning supplies out of reach?:  Yes     Swimming pool?:  No     Firearms in the home?: No      Hearing / Vision  Hearing or vision concerns?  No concerns, hearing and vision subjectively normal    Daily Activities    Diet and Exercise     Child gets at least 4 servings fruit or vegetables daily: Yes    Consumes beverages other than lowfat white milk or water: No    Child gets at least 60 minutes per day of active play: Yes    TV in child's room: No    Sleep      Sleep arrangement:co-sleeping with parent    Sleep pattern: sleeps through the night, feeding to sleep and naps (add details)    Elimination       Urinary frequency:4-6 times per 24 hours     Stool frequency: once per 48 hours     Elimination problems:  None     Toilet training status:  Starting to toilet train    Media     Types of media used: iPad, video/dvd/tv and computer/ video games    Daily use of media (hours): 5    Dental    Water source:  City water, bottled water and filtered water    Dental provider: patient has a dental home    Dental exam in last 6 months: Yes     No dental risks      Dental visit recommended: Dental home established, continue care every 6 months  Dental  "varnish declined by parent    Cardiac risk assessment:     Family history (males <55, females <65) of angina (chest pain), heart attack, heart surgery for clogged arteries, or stroke: no    Biological parent(s) with a total cholesterol over 240:  no  Dyslipidemia risk:    None    DEVELOPMENT  Screening tool used, reviewed with parent/guardian:   Electronic M-CHAT-R   MCHAT-R Total Score 12/3/2019   M-Chat Score 0 (Low-risk)    Follow-up:  LOW-RISK: Total Score is 0-2. No followup necessary  ASQ 2 Y Communication Gross Motor Fine Motor Problem Solving Personal-social   Score 60 60 55 60 60   Cutoff 25.17 38.07 35.16 29.78 31.54   Result Passed Passed Passed Passed Passed       PROBLEM LIST  Patient Active Problem List   Diagnosis     Normal  (single liveborn)     Breech birth     Sickle cell trait (H)     MEDICATIONS  No current outpatient medications on file.      ALLERGY  No Known Allergies    IMMUNIZATIONS  Immunization History   Administered Date(s) Administered     DTAP (<7y) 2019     DTAP-IPV/HIB (PENTACEL) 2018, 2018, 2018     Hep B, Peds or Adolescent 2017, 2018, 2018     HepA-ped 2 Dose 2019     Hib (PRP-T) 2019     MMR 2019     Pneumo Conj 13-V (2010&after) 2018, 2018, 2018, 2019     Rotavirus, monovalent, 2-dose 2018, 2018     Varicella 2019       HEALTH HISTORY SINCE LAST VISIT  No surgery, major illness or injury since last physical exam  Mom notices that she has been toe-walking more and more.   Weight gain is slow - brother also had a plateau at this age. She is picky. She was also recently sick with croup but mom not sure that that would contribute much. Drinks whole milk.     ROS  Constitutional, eye, ENT, skin, respiratory, cardiac, and GI are normal except as otherwise noted.    OBJECTIVE:   EXAM  Temp 97.4  F (36.3  C) (Axillary)   Ht 2' 9.66\" (0.855 m)   Wt 20 lb 15 oz (9.497 kg)   HC " "18.74\" (47.6 cm)   BMI 12.99 kg/m    54 %ile based on [facility] (Girls, 2-20 Years) Stature-for-age data based on Stature recorded on 12/6/2019.  <1 %ile based on [facility] (Girls, 2-20 Years) weight-for-age data based on Weight recorded on 12/6/2019.  53 %ile based on [facility] (Girls, 0-36 Months) head circumference-for-age based on Head Circumference recorded on 12/6/2019.  GENERAL: Alert, well appearing, no distress  SKIN: Clear. No significant rash, abnormal pigmentation or lesions  HEAD: Normocephalic.  EYES:  Symmetric light reflex and no eye movement on cover/uncover test. Normal conjunctivae.  EARS: Normal canals. Tympanic membranes are normal; gray and translucent.  NOSE: Normal without discharge.  MOUTH/THROAT: Clear. No oral lesions. Teeth without obvious abnormalities.  NECK: Supple, no masses.  No thyromegaly.  LYMPH NODES: No adenopathy  LUNGS: Clear. No rales, rhonchi, wheezing or retractions  HEART: Regular rhythm. Normal S1/S2. No murmurs. Normal pulses.  ABDOMEN: Soft, non-tender, not distended, no masses or hepatosplenomegaly. Bowel sounds normal.   GENITALIA: Normal female external genitalia. Ryan stage I,  No inguinal herniae are present.  EXTREMITIES: Full range of motion, no deformities  NEUROLOGIC: No focal findings. Cranial nerves grossly intact: DTR's normal. Normal gait, strength and tone    ASSESSMENT/PLAN:   1. Encounter for routine child health examination w/o abnormal findings  - Lead Capillary  - DEVELOPMENTAL TEST, DOWLING  - HEPA VACCINE PED/ADOL-2 DOSE [[id number]]  - Capillary Blood Collection    2. Toe-walking  I'd like her to have a physical therapy evaluation, especially since mom is noticing more toe walking than before.   - PHYSICAL THERAPY REFERRAL; Future    3. Slow weight gain in child  Brother had a very similar pattern. Advised whole milk 16-24 oz daily. Reviewed how to increase calories in diet and gave hand out. Parents will weigh her at home or here in 1 month and if at home will MyChart " the weight so we can track this closely.       Anticipatory Guidance  The following topics were discussed:  SOCIAL/ FAMILY:    Toilet training    Speech/language    Reading to child    Given a book from [employer] & Read    Limit TV - < 2 hrs/day  NUTRITION:    Variety at mealtime    Appetite fluctuation    Calcium/ Iron sources  HEALTH/ SAFETY:    Dental hygiene    Lead risk    Preventive Care Plan  Immunizations    See orders in EpicCare.  I reviewed the signs and symptoms of adverse effects and when to seek medical care if they should arise.  Referrals/Ongoing Specialty care: Yes, see orders in EpicCare  See other orders in EpicCare.  BMI at <1 %ile based on CDC (Girls, 2-20 Years) BMI-for-age based on body measurements available as of 12/6/2019. Underweight      FOLLOW-UP:  Monthly weight checks at home and [facility] result or in clinic   at 2  years for a Preventive Care visit    Resources  Goal Tracker: Be More Active  Goal Tracker: Less Screen Time  Goal Tracker: Drink More Water  Goal Tracker: Eat More Fruits and Veggies  [place] Child and Teen Checkups (C&TC) Schedule of Age-Related Screening Standards    TARSHA Flor CNP  [facility]   - - -

## 2020-09-17 ENCOUNTER — APPOINTMENT (OUTPATIENT)
Dept: OPHTHALMOLOGY | Facility: CLINIC | Age: 35
End: 2020-09-17
Payer: COMMERCIAL

## 2020-09-17 ENCOUNTER — NON-APPOINTMENT (OUTPATIENT)
Age: 35
End: 2020-09-17

## 2020-09-17 PROCEDURE — 92004 COMPRE OPH EXAM NEW PT 1/>: CPT

## 2020-09-29 ENCOUNTER — APPOINTMENT (OUTPATIENT)
Dept: OPHTHALMOLOGY | Facility: CLINIC | Age: 35
End: 2020-09-29

## 2020-11-26 NOTE — ED ADULT NURSE NOTE - NS ED NOTE  TALK SOMEONE YN
Pt's wife brought 3 beers to bedside. PRN one-time dose of ativan ordered. Will continue to monitor.  Electronically signed by Unique Malloy RN on 11/25/2020 at 7:26 PM No

## 2020-12-01 ENCOUNTER — EMERGENCY (EMERGENCY)
Facility: HOSPITAL | Age: 35
LOS: 1 days | Discharge: HOME | End: 2020-12-01
Attending: EMERGENCY MEDICINE | Admitting: EMERGENCY MEDICINE
Payer: COMMERCIAL

## 2020-12-01 VITALS
TEMPERATURE: 97 F | DIASTOLIC BLOOD PRESSURE: 76 MMHG | RESPIRATION RATE: 18 BRPM | SYSTOLIC BLOOD PRESSURE: 107 MMHG | OXYGEN SATURATION: 100 % | HEART RATE: 71 BPM

## 2020-12-01 LAB
ALBUMIN SERPL ELPH-MCNC: 4 G/DL — SIGNIFICANT CHANGE UP (ref 3.3–5)
ALP SERPL-CCNC: 150 U/L — HIGH (ref 40–120)
ALT FLD-CCNC: 34 U/L — SIGNIFICANT CHANGE UP (ref 4–41)
ANION GAP SERPL CALC-SCNC: 8 MMO/L — SIGNIFICANT CHANGE UP (ref 7–14)
APTT BLD: 39 SEC — HIGH (ref 27–36.3)
AST SERPL-CCNC: 24 U/L — SIGNIFICANT CHANGE UP (ref 4–40)
BASOPHILS # BLD AUTO: 0.08 K/UL — SIGNIFICANT CHANGE UP (ref 0–0.2)
BASOPHILS NFR BLD AUTO: 0.9 % — SIGNIFICANT CHANGE UP (ref 0–2)
BILIRUB SERPL-MCNC: 0.2 MG/DL — SIGNIFICANT CHANGE UP (ref 0.2–1.2)
BUN SERPL-MCNC: 15 MG/DL — SIGNIFICANT CHANGE UP (ref 7–23)
CALCIUM SERPL-MCNC: 9.7 MG/DL — SIGNIFICANT CHANGE UP (ref 8.4–10.5)
CHLORIDE SERPL-SCNC: 101 MMOL/L — SIGNIFICANT CHANGE UP (ref 98–107)
CO2 SERPL-SCNC: 28 MMOL/L — SIGNIFICANT CHANGE UP (ref 22–31)
CREAT SERPL-MCNC: 0.95 MG/DL — SIGNIFICANT CHANGE UP (ref 0.5–1.3)
EOSINOPHIL # BLD AUTO: 0.28 K/UL — SIGNIFICANT CHANGE UP (ref 0–0.5)
EOSINOPHIL NFR BLD AUTO: 3.1 % — SIGNIFICANT CHANGE UP (ref 0–6)
GLUCOSE SERPL-MCNC: 87 MG/DL — SIGNIFICANT CHANGE UP (ref 70–99)
HCT VFR BLD CALC: 46.6 % — SIGNIFICANT CHANGE UP (ref 39–50)
HGB BLD-MCNC: 14.2 G/DL — SIGNIFICANT CHANGE UP (ref 13–17)
IMM GRANULOCYTES NFR BLD AUTO: 1.3 % — SIGNIFICANT CHANGE UP (ref 0–1.5)
INR BLD: 1.24 — HIGH (ref 0.88–1.16)
LYMPHOCYTES # BLD AUTO: 3.37 K/UL — HIGH (ref 1–3.3)
LYMPHOCYTES # BLD AUTO: 36.7 % — SIGNIFICANT CHANGE UP (ref 13–44)
MCHC RBC-ENTMCNC: 23.9 PG — LOW (ref 27–34)
MCHC RBC-ENTMCNC: 30.5 % — LOW (ref 32–36)
MCV RBC AUTO: 78.6 FL — LOW (ref 80–100)
MONOCYTES # BLD AUTO: 0.69 K/UL — SIGNIFICANT CHANGE UP (ref 0–0.9)
MONOCYTES NFR BLD AUTO: 7.5 % — SIGNIFICANT CHANGE UP (ref 2–14)
NEUTROPHILS # BLD AUTO: 4.64 K/UL — SIGNIFICANT CHANGE UP (ref 1.8–7.4)
NEUTROPHILS NFR BLD AUTO: 50.5 % — SIGNIFICANT CHANGE UP (ref 43–77)
NRBC # FLD: 0 K/UL — SIGNIFICANT CHANGE UP (ref 0–0)
OB PNL STL: NEGATIVE — SIGNIFICANT CHANGE UP
PLATELET # BLD AUTO: 286 K/UL — SIGNIFICANT CHANGE UP (ref 150–400)
PMV BLD: 9.9 FL — SIGNIFICANT CHANGE UP (ref 7–13)
POTASSIUM SERPL-MCNC: 4.3 MMOL/L — SIGNIFICANT CHANGE UP (ref 3.5–5.3)
POTASSIUM SERPL-SCNC: 4.3 MMOL/L — SIGNIFICANT CHANGE UP (ref 3.5–5.3)
PROT SERPL-MCNC: 7.3 G/DL — SIGNIFICANT CHANGE UP (ref 6–8.3)
PROTHROM AB SERPL-ACNC: 14.1 SEC — HIGH (ref 10.6–13.6)
RBC # BLD: 5.93 M/UL — HIGH (ref 4.2–5.8)
RBC # FLD: 14.7 % — HIGH (ref 10.3–14.5)
SODIUM SERPL-SCNC: 137 MMOL/L — SIGNIFICANT CHANGE UP (ref 135–145)
TROPONIN T, HIGH SENSITIVITY: < 6 NG/L — SIGNIFICANT CHANGE UP (ref ?–14)
WBC # BLD: 9.18 K/UL — SIGNIFICANT CHANGE UP (ref 3.8–10.5)
WBC # FLD AUTO: 9.18 K/UL — SIGNIFICANT CHANGE UP (ref 3.8–10.5)

## 2020-12-01 PROCEDURE — 93010 ELECTROCARDIOGRAM REPORT: CPT

## 2020-12-01 PROCEDURE — 99284 EMERGENCY DEPT VISIT MOD MDM: CPT | Mod: 25

## 2020-12-01 PROCEDURE — 71046 X-RAY EXAM CHEST 2 VIEWS: CPT | Mod: 26

## 2020-12-01 RX ORDER — SODIUM CHLORIDE 9 MG/ML
1000 INJECTION INTRAMUSCULAR; INTRAVENOUS; SUBCUTANEOUS ONCE
Refills: 0 | Status: COMPLETED | OUTPATIENT
Start: 2020-12-01 | End: 2020-12-01

## 2020-12-01 RX ADMIN — SODIUM CHLORIDE 1000 MILLILITER(S): 9 INJECTION INTRAMUSCULAR; INTRAVENOUS; SUBCUTANEOUS at 10:36

## 2020-12-01 NOTE — ED PROVIDER NOTE - OBJECTIVE STATEMENT
35M w/ no PMHx p/w pleuritic CP since 3am today.  Pt. woke up at his usual hour and noticed CP.  Denies any radiation of pain or associated SOB, n/v.  Denies hx of MI, CVA, cardiac stents.  Had a stress test two months ago, which was reportedly normal.  Uber/.  Denies any recent long-distance travel, smoking, drug use.  Denies hx of DVT/PE, cancer.  States he had a few episodes of dark, black stools last night.  Felt slightly lightheaded today morning after showering.  Denies any rectal bleed.  Denies sick contacts. 35M w/ no PMHx p/w CP since 3am today.  Pt. woke up at his usual hour and noticed CP.  Denies any radiation of pain or associated SOB, n/v.  Denies hx of MI, CVA, cardiac stents.  Had a stress test two months ago, which was reportedly normal.  Uber/.  Denies any recent long-distance travel, smoking, drug use.  Denies hx of DVT/PE, cancer.  States he had a few episodes of dark, black stools last night.  Felt slightly lightheaded today morning after showering.  Denies any rectal bleed.  Denies sick contacts.

## 2020-12-01 NOTE — ED PROVIDER NOTE - PATIENT PORTAL LINK FT
You can access the FollowMyHealth Patient Portal offered by [facility] by registering at the following website: [email or web address]. By joining [employer]’s FollowMyHealth portal, you will also be able to view your health information using other applications (apps) compatible with our system.

## 2020-12-01 NOTE — ED PROVIDER NOTE - CARE PLAN
Principal Discharge DX:	Pleuritic chest pain   Principal Discharge DX:	Chest pain, unspecified type

## 2020-12-01 NOTE — ED PROVIDER NOTE - CLINICAL SUMMARY MEDICAL DECISION MAKING FREE TEXT BOX
35M p/w pleuritic CP and black stools.  Low risk for ACS, but will r/o given EKG and obese body habitus.  Low wells, PERC negative, so no indication to w/u PE at this time.  VSS.  Will do rectal exam and send occult feces.  Will get basics, trops, EKG, CXR.  EKG shows RBBB and LAD, no acute ischemic changes, unchanged from prior.  Will reassess and dispo pending results. 35M p/w  CP and black stools.  Low risk for ACS, but will r/o given EKG and obese body habitus.  Low wells, PERC negative, so no indication to w/u PE at this time.  VSS.  Will do rectal exam and send occult feces.  Will get basics, trops, EKG, CXR.  EKG shows RBBB and LAD, no acute ischemic changes, unchanged from prior.  Will reassess and dispo pending results.

## 2020-12-01 NOTE — ED PROVIDER NOTE - PROGRESS NOTE DETAILS
Tommie PGY2 - Discussed results w/ pt., who understands them.  Advised pt. to f/u w/ cardiologist and gastroenterologist.  Pt. verbalized understanding and agreement w/ plan.  All other questions and concerns have been addressed.  Pt. will be dc/d. Tommie PGY2 - Discussed results w/ pt., who understands them, copies given.  Advised pt. to f/u w/ cardiologist and gastroenterologist.  Pt. verbalized understanding and agreement w/ plan.  All other questions and concerns have been addressed.  return precautions discussed. Pt. will be dc/d.

## 2020-12-01 NOTE — ED ADULT TRIAGE NOTE - CHIEF COMPLAINT QUOTE
pt with co 4 episodes of black stool with dizziness. Pt developed chest pain at 3am.  Pt denies nausea reports dizziness.

## 2020-12-01 NOTE — ED PROVIDER NOTE - ATTENDING CONTRIBUTION TO CARE
agree with above hpi  on my exam  GEN - NAD; well appearing; A+O x3, elevated bmi  HEAD - NC/AT   EYES- PERRL, EOMI  ENT: Airway patent, mmm, Oral cavity and pharynx normal. No inflammation, swelling, exudate, or lesions.  NECK: Neck supple  PULMONARY - CTA b/l, symmetric breath sounds.   CARDIAC -s1s2, RRR, no M,G,R  ABDOMEN - +BS, ND, NT, soft, no guarding, no rebound, no masses   BACK - no CVA tenderness, Normal  spine   EXTREMITIES - FROM, symmetric pulses, capillary refill < 2 seconds, no edema   SKIN - no rash or bruising   NEUROLOGIC - alert, speech clear, no focal deficits  PSYCH -nl mood/affect, nl insight.  Patient presents to the ed for constant mid sternal cp since 3 am today, no other associated symptoms, no other radiation, also notes previous dark stools but now back to baseline. No fevers, chills, ha, vision changes, sweating, nausea, vomiting, le edema, recent long trips, hormone use. Has h/o cad in aunt. Patient with benign physical exam, vs ok. Plan for labs, ekg, cxr, very low suspicion for acs though will get trop given >3h constant cp, eval for elec disturbances, organ dysfunction, signs of arrythmia, gi bl, anemia, ptx, monitor, and reass.

## 2020-12-01 NOTE — ED PROVIDER NOTE - NSFOLLOWUPINSTRUCTIONS_ED_ALL_ED_FT
You were seen for chest pain and dark stools.    Your blood work, EKG, and chest xray do not show anything acutely concerning.    Please follow up with your cardiologist and gastroenterologist within one week.  Bring a copy of your results when you see them.    If you start developing crushing chest pain associated with difficulty breathing, vomiting, start having profuse rectal bleeding, lose consciousness, or have any other concerning symptoms, seek immediate medical attention.

## 2020-12-01 NOTE — ED PROVIDER NOTE - PHYSICAL EXAMINATION
GENERAL: Patient awake alert NAD.  HEENT: NC/AT, dry mucous membranes, PERRL, EOMI.  LUNGS: CTAB, no wheezes or crackles.  CARDIAC: RRR, no m/r/g.  ABDOMEN: Soft, NT, ND, No rebound, guarding.  EXT: No edema. No calf tenderness. CV 2+DP/PT bilaterally.  MSK: No pain with movement, no deformities.  NEURO: A&Ox3. Moving all extremities.  SKIN: Warm and dry. No rash.  PSYCH: Normal affect. GENERAL: Patient awake alert NAD.  HEENT: NC/AT, dry mucous membranes, PERRL, EOMI.  LUNGS: CTAB, no wheezes or crackles.  CARDIAC: RRR, no m/r/g.  ABDOMEN: Soft, NT, ND, No rebound, guarding.  : Rectal exam performed w/ Taylor RN as chaperone.  Hard, brown stools.  EXT: No edema. No calf tenderness. CV 2+DP/PT bilaterally.  MSK: No pain with movement, no deformities.  NEURO: A&Ox3. Moving all extremities.  SKIN: Warm and dry. No rash.  PSYCH: Normal affect.

## 2020-12-01 NOTE — ED ADULT NURSE NOTE - OBJECTIVE STATEMENT
Receive pt in spot 15 alert and oriented x 4 c/o dark stool dizziness and chest pain.  Denies sob, chills.  Resp unlabored.  Pt NSR on cardiac monitoring.  Skin intact.  VS see charting.  IV 20G RAC placed.  NS in progress.

## 2022-01-12 ENCOUNTER — EMERGENCY (EMERGENCY)
Facility: HOSPITAL | Age: 37
LOS: 1 days | Discharge: HOME | End: 2022-01-12
Attending: EMERGENCY MEDICINE | Admitting: EMERGENCY MEDICINE
Payer: COMMERCIAL

## 2022-01-12 VITALS
OXYGEN SATURATION: 100 % | SYSTOLIC BLOOD PRESSURE: 137 MMHG | TEMPERATURE: 98 F | DIASTOLIC BLOOD PRESSURE: 91 MMHG | RESPIRATION RATE: 18 BRPM | HEART RATE: 98 BPM

## 2022-01-12 VITALS
HEART RATE: 95 BPM | DIASTOLIC BLOOD PRESSURE: 82 MMHG | TEMPERATURE: 98 F | SYSTOLIC BLOOD PRESSURE: 134 MMHG | OXYGEN SATURATION: 100 % | RESPIRATION RATE: 18 BRPM

## 2022-01-12 LAB
ALBUMIN SERPL ELPH-MCNC: 4.3 G/DL — SIGNIFICANT CHANGE UP (ref 3.3–5)
ALP SERPL-CCNC: 160 U/L — HIGH (ref 40–120)
ALT FLD-CCNC: 27 U/L — SIGNIFICANT CHANGE UP (ref 4–41)
ANION GAP SERPL CALC-SCNC: 12 MMOL/L — SIGNIFICANT CHANGE UP (ref 7–14)
AST SERPL-CCNC: 21 U/L — SIGNIFICANT CHANGE UP (ref 4–40)
BASOPHILS # BLD AUTO: 0.08 K/UL — SIGNIFICANT CHANGE UP (ref 0–0.2)
BASOPHILS NFR BLD AUTO: 0.6 % — SIGNIFICANT CHANGE UP (ref 0–2)
BILIRUB SERPL-MCNC: 0.2 MG/DL — SIGNIFICANT CHANGE UP (ref 0.2–1.2)
BUN SERPL-MCNC: 16 MG/DL — SIGNIFICANT CHANGE UP (ref 7–23)
CALCIUM SERPL-MCNC: 9.8 MG/DL — SIGNIFICANT CHANGE UP (ref 8.4–10.5)
CHLORIDE SERPL-SCNC: 102 MMOL/L — SIGNIFICANT CHANGE UP (ref 98–107)
CO2 SERPL-SCNC: 27 MMOL/L — SIGNIFICANT CHANGE UP (ref 22–31)
CREAT SERPL-MCNC: 1.01 MG/DL — SIGNIFICANT CHANGE UP (ref 0.5–1.3)
EOSINOPHIL # BLD AUTO: 0.4 K/UL — SIGNIFICANT CHANGE UP (ref 0–0.5)
EOSINOPHIL NFR BLD AUTO: 3.2 % — SIGNIFICANT CHANGE UP (ref 0–6)
GLUCOSE SERPL-MCNC: 96 MG/DL — SIGNIFICANT CHANGE UP (ref 70–99)
HCT VFR BLD CALC: 45.5 % — SIGNIFICANT CHANGE UP (ref 39–50)
HGB BLD-MCNC: 14.4 G/DL — SIGNIFICANT CHANGE UP (ref 13–17)
IANC: 7.72 K/UL — SIGNIFICANT CHANGE UP (ref 1.5–8.5)
IMM GRANULOCYTES NFR BLD AUTO: 1 % — SIGNIFICANT CHANGE UP (ref 0–1.5)
LYMPHOCYTES # BLD AUTO: 25.7 % — SIGNIFICANT CHANGE UP (ref 13–44)
LYMPHOCYTES # BLD AUTO: 3.18 K/UL — SIGNIFICANT CHANGE UP (ref 1–3.3)
MCHC RBC-ENTMCNC: 24.6 PG — LOW (ref 27–34)
MCHC RBC-ENTMCNC: 31.6 GM/DL — LOW (ref 32–36)
MCV RBC AUTO: 77.6 FL — LOW (ref 80–100)
MONOCYTES # BLD AUTO: 0.87 K/UL — SIGNIFICANT CHANGE UP (ref 0–0.9)
MONOCYTES NFR BLD AUTO: 7 % — SIGNIFICANT CHANGE UP (ref 2–14)
NEUTROPHILS # BLD AUTO: 7.72 K/UL — HIGH (ref 1.8–7.4)
NEUTROPHILS NFR BLD AUTO: 62.5 % — SIGNIFICANT CHANGE UP (ref 43–77)
NRBC # BLD: 0 /100 WBCS — SIGNIFICANT CHANGE UP
NRBC # FLD: 0 K/UL — SIGNIFICANT CHANGE UP
PLATELET # BLD AUTO: 336 K/UL — SIGNIFICANT CHANGE UP (ref 150–400)
POTASSIUM SERPL-MCNC: 4.2 MMOL/L — SIGNIFICANT CHANGE UP (ref 3.5–5.3)
POTASSIUM SERPL-SCNC: 4.2 MMOL/L — SIGNIFICANT CHANGE UP (ref 3.5–5.3)
PROT SERPL-MCNC: 8 G/DL — SIGNIFICANT CHANGE UP (ref 6–8.3)
RBC # BLD: 5.86 M/UL — HIGH (ref 4.2–5.8)
RBC # FLD: 14.8 % — HIGH (ref 10.3–14.5)
SODIUM SERPL-SCNC: 141 MMOL/L — SIGNIFICANT CHANGE UP (ref 135–145)
URATE SERPL-MCNC: 8.4 MG/DL — SIGNIFICANT CHANGE UP (ref 3.4–8.8)
WBC # BLD: 12.37 K/UL — HIGH (ref 3.8–10.5)
WBC # FLD AUTO: 12.37 K/UL — HIGH (ref 3.8–10.5)

## 2022-01-12 PROCEDURE — 73610 X-RAY EXAM OF ANKLE: CPT | Mod: 26,RT

## 2022-01-12 PROCEDURE — 73630 X-RAY EXAM OF FOOT: CPT | Mod: 26,RT

## 2022-01-12 PROCEDURE — 99284 EMERGENCY DEPT VISIT MOD MDM: CPT

## 2022-01-12 RX ORDER — KETOROLAC TROMETHAMINE 30 MG/ML
15 SYRINGE (ML) INJECTION ONCE
Refills: 0 | Status: DISCONTINUED | OUTPATIENT
Start: 2022-01-12 | End: 2022-01-12

## 2022-01-12 RX ORDER — SODIUM CHLORIDE 9 MG/ML
1000 INJECTION INTRAMUSCULAR; INTRAVENOUS; SUBCUTANEOUS ONCE
Refills: 0 | Status: COMPLETED | OUTPATIENT
Start: 2022-01-12 | End: 2022-01-12

## 2022-01-12 RX ORDER — MORPHINE SULFATE 50 MG/1
4 CAPSULE, EXTENDED RELEASE ORAL ONCE
Refills: 0 | Status: DISCONTINUED | OUTPATIENT
Start: 2022-01-12 | End: 2022-01-12

## 2022-01-12 RX ADMIN — MORPHINE SULFATE 4 MILLIGRAM(S): 50 CAPSULE, EXTENDED RELEASE ORAL at 21:37

## 2022-01-12 RX ADMIN — Medication 15 MILLIGRAM(S): at 19:47

## 2022-01-12 RX ADMIN — SODIUM CHLORIDE 1000 MILLILITER(S): 9 INJECTION INTRAMUSCULAR; INTRAVENOUS; SUBCUTANEOUS at 19:54

## 2022-01-12 RX ADMIN — MORPHINE SULFATE 4 MILLIGRAM(S): 50 CAPSULE, EXTENDED RELEASE ORAL at 19:50

## 2022-01-12 NOTE — ED PROVIDER NOTE - PLAN OF CARE
You were seen today for your gout attack.  Take the prescribed medication as directed.  Be sure to follow up with your primary care physician in 2-3 days for re-evaluation.  RETURN TO THE EMERGENCY DEPARTMENT IMMEDIATELY IF YOU HAVE SEVERE PAIN THAT CANNOT BE CONTROLLED BY MEDICATION, CANNOT BEND YOUR AFFECTED JOINT, DEVELOP FEVER, OR FOR ANY OTHER CONCERN.

## 2022-01-12 NOTE — ED PROVIDER NOTE - OBJECTIVE STATEMENT
37 y/o male with h/o gout c/o right foot and ankle pain and swelling x3 days.  Thinks he "bumped" his foot against something.  Has had gradually worsening sx.  Able to ambulate with limp.  Typically has gout flares in left big toe.  Denies recent intake of etoh or red meat.  Has been drinking a lot of orange juice for recent covid infection.

## 2022-01-12 NOTE — ED PROVIDER NOTE - PATIENT PORTAL LINK FT
You can access the FollowMyHealth Patient Portal offered by [facility] by registering at the following website: [email or web address]. By joining [employer]’s FollowMyHealth portal, you will also be able to view your health information using other applications (apps) compatible with our system.

## 2022-01-12 NOTE — ED PROVIDER NOTE - CLINICAL SUMMARY MEDICAL DECISION MAKING FREE TEXT BOX
37 y/o male h/o gout c/o right ankle and foot pain and swelling x3 days.  Likely gouty flare secondary to minor trauma.  Obtain cbc cmp uric acid xr foot/ankle give ivf pain med reassess.

## 2022-01-12 NOTE — ED ADULT TRIAGE NOTE - CHIEF COMPLAINT QUOTE
Pt states that he injured right foot/ankle pain x 3 days, states that he hit it on something.  Pt denies pMH

## 2022-01-12 NOTE — ED ADULT NURSE NOTE - OBJECTIVE STATEMENT
pt A&Ox4. pt c/o of right foot pain for the last three days described as "burning feeling" that is constant. pt c/o of difficulty ambulating because of pain (10/10). pt ambulatory at baseline. pt ankle appears swollen. pt c/o headache. pt states pmh of gout. pt respirations even and unlabored with no accessory muscle use. pt denies chest pain and sob. pt denies dizziness, nausea, and vomiting at this time. 20G iv placed to the RAC. labs collected and sent. pt medicated as per md orders. will continue to monitor.

## 2022-01-12 NOTE — ED PROVIDER NOTE - CARE PLAN
1 Principal Discharge DX:	Pain and swelling of right ankle  Secondary Diagnosis:	Right foot pain   Principal Discharge DX:	Gout  Assessment and plan of treatment:	You were seen today for your gout attack.  Take the prescribed medication as directed.  Be sure to follow up with your primary care physician in 2-3 days for re-evaluation.  RETURN TO THE EMERGENCY DEPARTMENT IMMEDIATELY IF YOU HAVE SEVERE PAIN THAT CANNOT BE CONTROLLED BY MEDICATION, CANNOT BEND YOUR AFFECTED JOINT, DEVELOP FEVER, OR FOR ANY OTHER CONCERN.  Secondary Diagnosis:	Right foot pain

## 2022-04-04 NOTE — ED PROVIDER NOTE - IV ALTEPLASE INCLUSION HIDDEN
Chief Complaint: post op    HPI: Patient is a 51 y.o. y.o. female  who presents to GYN clinic for follow up. Pt s/p TLH/BSO, Ex lap ureteral reimplantation presenting for cuff check. Pt doing well today. Recently had her ureteral stent removed. Denies VB, or pelvic pain. She has no other complaints today. ROS negative unless mentioned above.    Physical Exam:  Vitals:    22 0813   BP: 122/81   Pulse: 86   Weight: 103.4 kg (228 lb)   Body mass index is 36.8 kg/m².    Gen: NAD, well developed, well nourished  Psych: alert and oriented x 3, normal affect  HEENT: normocephalic, atraumatic  Abd: soft, non-tender, non-distended, incision well healed  Pelvic: NEFG, no masses or lesions, vagina pink, no VB or VD, cuff intact, suture located in the midline  BME: cuff intact  Skin: warm and dry  Ext: no c/c/c, moves all extremities  Neurological: normal gait, gross motor function intact    Results:  UTERUS, CERVIX, BILATERAL FALLOPIAN TUBES AND OVARIES, HYSTERECTOMY AND   BILATERAL SALPINGO-OOPHORECTOMY:   ENDOMYOMETRIUM:      -- BENIGN PROLIFERATIVE PHASE ENDOMETRIUM.      -- ADENOMYOSIS.      -- LEIOMYOMATA.      -- UTERINE SEROSAL ADHESIONS.   ENDO/ECTOCERVIX:      -- NEGATIVE FOR DYSPLASIA.   BILATERAL ADNEXA:      -- BENIGN RIGHT OVARY WITH CYSTIC FOLLICLES AND FINE SEROSAL ADHESIONS.      -- ASSOCIATED BENIGN FALLOPIAN TUBE WITH PARATUBAL CYSTS AND BENIGN   FIMBRIATED END.      -- BENIGN RIGHT OVARY WITH FINE SEROSAL ADHESIONS.      -- ASSOCIATED BENIGN FALLOPIAN TUBE WITH PARATUBAL CYSTS,   ENDOSALPINGIOSIS AND BENIGN      FIMBRIATED END.     Assessment: Patient is a 51 y.o. y.o. female  with  Patient Active Problem List   Diagnosis    Uterine leiomyoma    Menorrhagia    Iron deficiency anemia due to chronic blood loss    S/P ureteral reimplantation    S/P laparoscopic hysterectomy       Plan:  Pt doing well today  Cuff intact  Incisions well healed   Pt released from post op restrictions   RTC for  annual exams or sooner if needed     Fede Buchanan MD         show

## 2022-05-29 NOTE — ED PROVIDER NOTE - NS ED ROS FT
General: denies fever, chills, weight loss/weight gain.  HENT: denies nasal congestion, sore throat, rhinorrhea, ear pain.  Eyes: denies visual changes, blurred vision, eye discharge, eye redness.  Neck: denies neck pain, neck swelling.  CV: +chest pain; denies palpitations.  Resp: denies difficulty breathing, cough.  Abdominal: +melena; denies nausea, vomiting, diarrhea, abdominal pain, blood in stool.  MSK: denies muscle aches, bony pain, leg pain, leg swelling.  Neuro: denies headaches, numbness, tingling, dizziness, lightheadedness.  Skin: denies rashes, cuts, bruises.  Hematologic: denies unexplained bruises. Patient with one or more new problems requiring additional work-up/treatment.

## 2022-08-23 NOTE — ED PROVIDER NOTE - DURATION
day(s) Propranolol Pregnancy And Lactation Text: This medication is Pregnancy Category C and it isn't known if it is safe during pregnancy. It is excreted in breast milk.

## 2023-02-04 ENCOUNTER — EMERGENCY (EMERGENCY)
Facility: HOSPITAL | Age: 38
LOS: 1 days | Discharge: HOME | End: 2023-02-04
Attending: EMERGENCY MEDICINE | Admitting: EMERGENCY MEDICINE
Payer: COMMERCIAL

## 2023-02-04 VITALS
RESPIRATION RATE: 16 BRPM | DIASTOLIC BLOOD PRESSURE: 70 MMHG | OXYGEN SATURATION: 100 % | SYSTOLIC BLOOD PRESSURE: 124 MMHG | TEMPERATURE: 98 F | HEART RATE: 60 BPM

## 2023-02-04 VITALS
HEART RATE: 71 BPM | TEMPERATURE: 98 F | SYSTOLIC BLOOD PRESSURE: 140 MMHG | RESPIRATION RATE: 18 BRPM | OXYGEN SATURATION: 99 % | DIASTOLIC BLOOD PRESSURE: 79 MMHG

## 2023-02-04 LAB
ANION GAP SERPL CALC-SCNC: 10 MMOL/L — SIGNIFICANT CHANGE UP (ref 7–14)
BUN SERPL-MCNC: 13 MG/DL — SIGNIFICANT CHANGE UP (ref 7–23)
CALCIUM SERPL-MCNC: 9.1 MG/DL — SIGNIFICANT CHANGE UP (ref 8.4–10.5)
CHLORIDE SERPL-SCNC: 103 MMOL/L — SIGNIFICANT CHANGE UP (ref 98–107)
CO2 SERPL-SCNC: 26 MMOL/L — SIGNIFICANT CHANGE UP (ref 22–31)
CREAT SERPL-MCNC: 0.86 MG/DL — SIGNIFICANT CHANGE UP (ref 0.5–1.3)
EGFR: 114 ML/MIN/1.73M2 — SIGNIFICANT CHANGE UP
GLUCOSE SERPL-MCNC: 96 MG/DL — SIGNIFICANT CHANGE UP (ref 70–99)
HCT VFR BLD CALC: 45.8 % — SIGNIFICANT CHANGE UP (ref 39–50)
HGB BLD-MCNC: 14.1 G/DL — SIGNIFICANT CHANGE UP (ref 13–17)
MCHC RBC-ENTMCNC: 23.9 PG — LOW (ref 27–34)
MCHC RBC-ENTMCNC: 30.8 GM/DL — LOW (ref 32–36)
MCV RBC AUTO: 77.5 FL — LOW (ref 80–100)
NRBC # BLD: 0 /100 WBCS — SIGNIFICANT CHANGE UP (ref 0–0)
NRBC # FLD: 0 K/UL — SIGNIFICANT CHANGE UP (ref 0–0)
PLATELET # BLD AUTO: 286 K/UL — SIGNIFICANT CHANGE UP (ref 150–400)
POTASSIUM SERPL-MCNC: 3.9 MMOL/L — SIGNIFICANT CHANGE UP (ref 3.5–5.3)
POTASSIUM SERPL-SCNC: 3.9 MMOL/L — SIGNIFICANT CHANGE UP (ref 3.5–5.3)
RBC # BLD: 5.91 M/UL — HIGH (ref 4.2–5.8)
RBC # FLD: 14.4 % — SIGNIFICANT CHANGE UP (ref 10.3–14.5)
SODIUM SERPL-SCNC: 139 MMOL/L — SIGNIFICANT CHANGE UP (ref 135–145)
TROPONIN T, HIGH SENSITIVITY RESULT: 7 NG/L — SIGNIFICANT CHANGE UP
WBC # BLD: 7.83 K/UL — SIGNIFICANT CHANGE UP (ref 3.8–10.5)
WBC # FLD AUTO: 7.83 K/UL — SIGNIFICANT CHANGE UP (ref 3.8–10.5)

## 2023-02-04 PROCEDURE — 71046 X-RAY EXAM CHEST 2 VIEWS: CPT | Mod: 26

## 2023-02-04 PROCEDURE — 99284 EMERGENCY DEPT VISIT MOD MDM: CPT

## 2023-02-04 RX ADMIN — Medication 30 MILLILITER(S): at 08:52

## 2023-02-04 NOTE — ED PROVIDER NOTE - NSFOLLOWUPINSTRUCTIONS_ED_ALL_ED_FT
Your EKG and chest Xray were normal.  You had a very low heart enzyme level value for troponin.  With one week of chest pains this makes heart attach much less likely, but a clear diagnosis for your pain was not found.  You should follow up with your regular doctor or Cardiology, and return to Emergency for worsening pain, fever, weakness, numbness, shortness of breath, nausea, sweats, lightheadedness, dizziness, fainting or any signs of distress.

## 2023-02-04 NOTE — ED PROVIDER NOTE - OBJECTIVE STATEMENT
37 male complaining of sharp episodic chest pains, fleeting but returning often, and increased chest pressure this a.m.  Patient  has similar sharp chest pains for years, with previous negative work-up including stress test.  Patient had increased exercise and lost a significant mount of weight over the past 2 years.   in December had some increase in his weight again, and has had increased stresses since then.  Had COVID in early December and resolved within 4 to 5 days.  This week has noted episodes of fleeting sharp chest pains, similar to his usual complaint, but occurring daily.  This a.m. awoke at 5:40 AM with sharp chest pain and has had many episodes prior to arrival, while driving had associated chest pressure feeling which was unusual for him and decided to go to ED.  Patient  may have "gas" but has no similar complaint due to gas in the past.  No meds taken prior to arrival.

## 2023-02-04 NOTE — ED PROVIDER NOTE - CLINICAL SUMMARY MEDICAL DECISION MAKING FREE TEXT BOX
37M with sharp episodic CP x yrs with prev. neg workup, now c/o all wk w sharp CP and this a.m. assoc. chest "pressure". Minimal sx in ED, states had chest pain during EKG. CXR, lab, reassess.

## 2023-02-04 NOTE — ED PROVIDER NOTE - RESPIRATORY NEGATIVE STATEMENT, MLM
no chest pain, no cough, and no shortness of breath. pt with aide at bedside/age appropriate assistance

## 2023-02-04 NOTE — ED ADULT NURSE REASSESSMENT NOTE - NS ED NURSE REASSESS COMMENT FT1
pt alert,oriented x3- received to rw, ankit patent r ac. awaiting labs. states maalox helped pain. states comfortable at present. will continue to monitor

## 2023-02-04 NOTE — ED PROVIDER NOTE - DIFFERENTIAL DIAGNOSIS
Differential Diagnosis myocarditis, pericarditis, ischemic CAD, acute MI, costochondritis, reactive airway disease, pleural effusion, pericardial effusion

## 2023-02-04 NOTE — ED ADULT TRIAGE NOTE - CHIEF COMPLAINT QUOTE
Pt st' I woke this morning with Chest pain...constant since 5:30am. No shortness of breath, " Denies med hx

## 2023-02-04 NOTE — ED PROVIDER NOTE - PATIENT PORTAL LINK FT
You can access the FollowMyHealth Patient Portal offered by [facility] by registering at the following website: [email or web address]. By joining [employer]’s FollowMyHealth portal, you will also be able to view your health information using other applications (apps) compatible with our system.

## 2023-04-20 PROBLEM — Z00.00 ENCOUNTER FOR PREVENTIVE HEALTH EXAMINATION: Status: ACTIVE | Noted: 2023-04-20

## 2023-05-17 ENCOUNTER — APPOINTMENT (OUTPATIENT)
Dept: PULMONOLOGY | Facility: CLINIC | Age: 38
End: 2023-05-17
Payer: COMMERCIAL

## 2023-05-17 ENCOUNTER — NON-APPOINTMENT (OUTPATIENT)
Age: 38
End: 2023-05-17

## 2023-05-17 VITALS
OXYGEN SATURATION: 95 % | BODY MASS INDEX: 38.36 KG/M2 | WEIGHT: 315 LBS | SYSTOLIC BLOOD PRESSURE: 124 MMHG | DIASTOLIC BLOOD PRESSURE: 84 MMHG | HEART RATE: 74 BPM | HEIGHT: 76 IN

## 2023-05-17 VITALS
HEART RATE: 74 BPM | BODY MASS INDEX: 38.36 KG/M2 | SYSTOLIC BLOOD PRESSURE: 124 MMHG | OXYGEN SATURATION: 95 % | HEIGHT: 76 IN | DIASTOLIC BLOOD PRESSURE: 84 MMHG | WEIGHT: 315 LBS

## 2023-05-17 DIAGNOSIS — Z01.811 ENCOUNTER FOR PREPROCEDURAL RESPIRATORY EXAMINATION: ICD-10-CM

## 2023-05-17 PROCEDURE — 99204 OFFICE O/P NEW MOD 45 MIN: CPT | Mod: 25

## 2023-05-17 PROCEDURE — 94010 BREATHING CAPACITY TEST: CPT

## 2023-05-17 PROCEDURE — ZZZZZ: CPT

## 2023-05-17 PROCEDURE — 94729 DIFFUSING CAPACITY: CPT

## 2023-05-17 PROCEDURE — 94727 GAS DIL/WSHOT DETER LNG VOL: CPT

## 2023-05-17 PROCEDURE — 88738 HGB QUANT TRANSCUTANEOUS: CPT

## 2023-05-17 PROCEDURE — 71045 X-RAY EXAM CHEST 1 VIEW: CPT

## 2023-05-17 NOTE — CONSULT LETTER
[Dear  ___] : Dear  [unfilled], [Courtesy Letter:] : I had the pleasure of seeing your patient, [unfilled], in my office today. [Please see my note below.] : Please see my note below. [Consult Closing:] : Thank you very much for allowing me to participate in the care of this patient.  If you have any questions, please do not hesitate to contact me. [Sincerely,] : Sincerely, [FreeTextEntry3] : Dr. Bridget Hernandez [DrCordell  ___] : Dr. SNOW

## 2023-05-17 NOTE — ASSESSMENT
[FreeTextEntry1] : Order home sleep study to definitively rule out obstructive sleep apnea.\par Advised him to return for pulmonary/sleep follow-up after sleep study has been performed.\par Also advised him to closely follow with you clinically.

## 2023-05-17 NOTE — HISTORY OF PRESENT ILLNESS
[TextBox_4] : Parvez is a pleasant 37-year-old gentleman with history of morbid obesity, he is planning on getting for sleeve gastrectomy by you in the near future.  He came in for preoperative pulmonary/sleep medicine evaluation today.  He complains of excessive daytime sleepiness and snoring for years, but no chest pain, cough or shortness of breath. \par He is a non smoker.

## 2023-05-17 NOTE — DISCUSSION/SUMMARY
[FreeTextEntry1] : Parvez is a patient planning on getting Sleeve Gastrectomy for History of Morbid Obesity.  Secondly, he is a patient with excessive daytime sleepiness and snoring, need to rule out obstructive sleep apnea preoperatively.

## 2023-05-17 NOTE — REASON FOR VISIT
[Consultation] : a consultation [Pre-op Risk Stratification] : pre-op risk stratification [TextBox_13] : Dr. Demetrius Sarabia

## 2023-05-17 NOTE — PROCEDURE
[FreeTextEntry1] : Pulmonary Function Test performed in my office today: Spirometry: Suggestive of mild restrictive defect; Lung Volume: Within normal limits with air trapping; Diffusion: Within normal limits.\par _________\par \par  Xray Chest 2 Views PA/Lat             Final\par \par \par   \par Chest x-ray PA and lateral views performed in my office today showed clear lungs, no evidence of infiltrates or pleural effusions. \par \par \par  Ordered by: NICOLE FONTANEZ       Collected/Examined: 17May2023 02:15PM       \par Verification Required       Stage: Final       \par  Performed at: In Office       Performed by: NICOLE FONTANEZ       Resulted: [id number] 02:15PM       Last Updated: 17May2023 02:15PM

## 2023-05-29 LAB — POCT - HEMOGLOBIN (HGB), QUANTITATIVE, TRANSCUTANEOUS: 15.1

## 2023-06-14 ENCOUNTER — APPOINTMENT (OUTPATIENT)
Dept: PULMONOLOGY | Facility: CLINIC | Age: 38
End: 2023-06-14

## 2023-07-05 ENCOUNTER — FORM ENCOUNTER (OUTPATIENT)
Age: 38
End: 2023-07-05

## 2023-07-05 ENCOUNTER — APPOINTMENT (OUTPATIENT)
Dept: PULMONOLOGY | Facility: CLINIC | Age: 38
End: 2023-07-05
Payer: COMMERCIAL

## 2023-07-06 PROCEDURE — 95800 SLP STDY UNATTENDED: CPT | Mod: 52

## 2023-07-07 PROCEDURE — 95800 SLP STDY UNATTENDED: CPT | Mod: 52

## 2023-08-21 ENCOUNTER — APPOINTMENT (OUTPATIENT)
Dept: PULMONOLOGY | Facility: CLINIC | Age: 38
End: 2023-08-21
Payer: COMMERCIAL

## 2023-08-21 DIAGNOSIS — R06.83 SNORING: ICD-10-CM

## 2023-08-21 DIAGNOSIS — G47.33 OBSTRUCTIVE SLEEP APNEA (ADULT) (PEDIATRIC): ICD-10-CM

## 2023-08-21 DIAGNOSIS — E66.01 MORBID (SEVERE) OBESITY DUE TO EXCESS CALORIES: ICD-10-CM

## 2023-08-21 PROCEDURE — 99214 OFFICE O/P EST MOD 30 MIN: CPT | Mod: 95

## 2023-08-21 NOTE — PROCEDURE
[FreeTextEntry1] : Home sleep study performed on July 6 and 7, 2023 showed evidence of mild obstructive sleep apnea with overall AHI of 9 events per hour sleep.

## 2023-08-21 NOTE — ASSESSMENT
[FreeTextEntry1] : Discussed with patient at length regarding the aforementioned sleep study findings and all treatment options in the office today, will start patient on APAP(Auto-titrating positive airway pressure) at 5-20 cm H2O via nasal mask at this point. Advised patient to return for sleep follow-up 1 month after APAP is started. Strongly advised patient on dieting, exercise and weight loss for history of morbid obesity.   Time spent in telehealth consultation, charting and care coordination is 35 minutes.

## 2023-08-21 NOTE — HISTORY OF PRESENT ILLNESS
[TextBox_4] : Continues to have excessive daytime sleepiness and snoring for years.  No chest pain or shortness of breath.   This visit was provided via telehealth using real-time 2-way audio visual technology.  The patient, DEDRA SEGOVIA, was located at home, [address] [place] [address] at the time of the visit.   The provider, Bridget Hernandez, was located at the office [address], [address], [place], [place] at the time of the visit.  The patient, Mr. DEDRA SEGOVIA  and physician Bridget Hernandez DO, participated in the telehealth encounter.  Verbal consent was obtained by the  from patient.

## 2023-08-21 NOTE — DISCUSSION/SUMMARY
[FreeTextEntry1] : Excessive daytime sleepiness and snoring secondary to just diagnosed mild obstructive sleep apnea.  Morbid obesity.

## 2023-12-04 ENCOUNTER — APPOINTMENT (OUTPATIENT)
Dept: PULMONOLOGY | Facility: CLINIC | Age: 38
End: 2023-12-04

## 2024-01-17 NOTE — ED ADULT NURSE NOTE - NSIMPLEMENTINTERV_GEN_ALL_ED
----- Message from Kole Le MD sent at 1/15/2024  8:00 PM CST -----  Regarding: overdue for f/u  Patient last seen 5/2022. Please call her to see if she has established with new primary care. If not, we can schedule her a follow up for an annual. If she has switched providers, let me know. It does not look like she has logged into her portal account for awhile    Sincerely,     Dr Le        
Implemented All Universal Safety Interventions:  [place] to call system. Call bell, personal items and telephone within reach. Instruct patient to call for assistance. Room bathroom lighting operational. Non-slip footwear when patient is off stretcher. Physically safe environment: no spills, clutter or unnecessary equipment. Stretcher in lowest position, wheels locked, appropriate side rails in place.

## 2024-07-08 ENCOUNTER — EMERGENCY (EMERGENCY)
Facility: HOSPITAL | Age: 39
LOS: 1 days | Discharge: HOME | End: 2024-07-08
Admitting: EMERGENCY MEDICINE
Payer: COMMERCIAL

## 2024-07-08 VITALS
SYSTOLIC BLOOD PRESSURE: 112 MMHG | WEIGHT: 315 LBS | RESPIRATION RATE: 17 BRPM | DIASTOLIC BLOOD PRESSURE: 74 MMHG | HEART RATE: 74 BPM | TEMPERATURE: 98 F | OXYGEN SATURATION: 100 %

## 2024-07-08 PROCEDURE — 99284 EMERGENCY DEPT VISIT MOD MDM: CPT

## 2024-07-08 RX ORDER — LIDOCAINE HCL 28 MG/G
1 GEL TOPICAL ONCE
Refills: 0 | Status: COMPLETED | OUTPATIENT
Start: 2024-07-08 | End: 2024-07-08

## 2024-07-08 RX ORDER — ACETAMINOPHEN 325 MG
975 TABLET ORAL ONCE
Refills: 0 | Status: COMPLETED | OUTPATIENT
Start: 2024-07-08 | End: 2024-07-08

## 2024-07-08 RX ORDER — KETOROLAC TROMETHAMINE 30 MG/ML
30 INJECTION, SOLUTION INTRAMUSCULAR ONCE
Refills: 0 | Status: DISCONTINUED | OUTPATIENT
Start: 2024-07-08 | End: 2024-07-08

## 2024-07-08 RX ADMIN — KETOROLAC TROMETHAMINE 30 MILLIGRAM(S): 30 INJECTION, SOLUTION INTRAMUSCULAR at 06:27

## 2024-07-08 RX ADMIN — LIDOCAINE HCL 1 PATCH: 28 GEL TOPICAL at 06:26

## 2024-07-08 RX ADMIN — Medication 975 MILLIGRAM(S): at 06:27

## 2025-04-27 ENCOUNTER — EMERGENCY (EMERGENCY)
Facility: HOSPITAL | Age: 40
LOS: 1 days | End: 2025-04-27
Attending: STUDENT IN AN ORGANIZED HEALTH CARE EDUCATION/TRAINING PROGRAM | Admitting: STUDENT IN AN ORGANIZED HEALTH CARE EDUCATION/TRAINING PROGRAM
Payer: COMMERCIAL

## 2025-04-27 VITALS
HEIGHT: 76 IN | DIASTOLIC BLOOD PRESSURE: 86 MMHG | OXYGEN SATURATION: 100 % | TEMPERATURE: 98 F | RESPIRATION RATE: 16 BRPM | WEIGHT: 315 LBS | HEART RATE: 68 BPM | SYSTOLIC BLOOD PRESSURE: 135 MMHG

## 2025-04-27 VITALS
SYSTOLIC BLOOD PRESSURE: 117 MMHG | OXYGEN SATURATION: 100 % | TEMPERATURE: 98 F | HEART RATE: 76 BPM | DIASTOLIC BLOOD PRESSURE: 73 MMHG | RESPIRATION RATE: 18 BRPM

## 2025-04-27 LAB
ALBUMIN SERPL ELPH-MCNC: 4 G/DL — SIGNIFICANT CHANGE UP (ref 3.3–5)
ALP SERPL-CCNC: 136 U/L — HIGH (ref 40–120)
ALT FLD-CCNC: 35 U/L — SIGNIFICANT CHANGE UP (ref 4–41)
ANION GAP SERPL CALC-SCNC: 13 MMOL/L — SIGNIFICANT CHANGE UP (ref 7–14)
APTT BLD: 34.7 SEC — SIGNIFICANT CHANGE UP (ref 26.1–36.8)
AST SERPL-CCNC: 22 U/L — SIGNIFICANT CHANGE UP (ref 4–40)
BASOPHILS # BLD AUTO: 0.06 K/UL — SIGNIFICANT CHANGE UP (ref 0–0.2)
BASOPHILS NFR BLD AUTO: 0.7 % — SIGNIFICANT CHANGE UP (ref 0–2)
BILIRUB SERPL-MCNC: 0.2 MG/DL — SIGNIFICANT CHANGE UP (ref 0.2–1.2)
BUN SERPL-MCNC: 15 MG/DL — SIGNIFICANT CHANGE UP (ref 7–23)
CALCIUM SERPL-MCNC: 9.5 MG/DL — SIGNIFICANT CHANGE UP (ref 8.4–10.5)
CHLORIDE SERPL-SCNC: 102 MMOL/L — SIGNIFICANT CHANGE UP (ref 98–107)
CO2 SERPL-SCNC: 24 MMOL/L — SIGNIFICANT CHANGE UP (ref 22–31)
CREAT SERPL-MCNC: 1.02 MG/DL — SIGNIFICANT CHANGE UP (ref 0.5–1.3)
EGFR: 96 ML/MIN/1.73M2 — SIGNIFICANT CHANGE UP
EGFR: 96 ML/MIN/1.73M2 — SIGNIFICANT CHANGE UP
EOSINOPHIL # BLD AUTO: 0.51 K/UL — HIGH (ref 0–0.5)
EOSINOPHIL NFR BLD AUTO: 6.4 % — HIGH (ref 0–6)
GLUCOSE SERPL-MCNC: 84 MG/DL — SIGNIFICANT CHANGE UP (ref 70–99)
HCT VFR BLD CALC: 46.6 % — SIGNIFICANT CHANGE UP (ref 39–50)
HGB BLD-MCNC: 15.5 G/DL — SIGNIFICANT CHANGE UP (ref 13–17)
IANC: 3.39 K/UL — SIGNIFICANT CHANGE UP (ref 1.8–7.4)
IMM GRANULOCYTES NFR BLD AUTO: 0.4 % — SIGNIFICANT CHANGE UP (ref 0–0.9)
INR BLD: 1.12 RATIO — SIGNIFICANT CHANGE UP (ref 0.85–1.16)
LIDOCAIN IGE QN: 69 U/L — HIGH (ref 7–60)
LYMPHOCYTES # BLD AUTO: 3.38 K/UL — HIGH (ref 1–3.3)
LYMPHOCYTES # BLD AUTO: 42.1 % — SIGNIFICANT CHANGE UP (ref 13–44)
MAGNESIUM SERPL-MCNC: 2 MG/DL — SIGNIFICANT CHANGE UP (ref 1.6–2.6)
MCHC RBC-ENTMCNC: 25.5 PG — LOW (ref 27–34)
MCHC RBC-ENTMCNC: 33.3 G/DL — SIGNIFICANT CHANGE UP (ref 32–36)
MCV RBC AUTO: 76.5 FL — LOW (ref 80–100)
MONOCYTES # BLD AUTO: 0.65 K/UL — SIGNIFICANT CHANGE UP (ref 0–0.9)
MONOCYTES NFR BLD AUTO: 8.1 % — SIGNIFICANT CHANGE UP (ref 2–14)
NEUTROPHILS # BLD AUTO: 3.39 K/UL — SIGNIFICANT CHANGE UP (ref 1.8–7.4)
NEUTROPHILS NFR BLD AUTO: 42.3 % — LOW (ref 43–77)
NRBC # BLD AUTO: 0 K/UL — SIGNIFICANT CHANGE UP (ref 0–0)
NRBC # FLD: 0 K/UL — SIGNIFICANT CHANGE UP (ref 0–0)
NRBC BLD AUTO-RTO: 0 /100 WBCS — SIGNIFICANT CHANGE UP (ref 0–0)
PLATELET # BLD AUTO: 257 K/UL — SIGNIFICANT CHANGE UP (ref 150–400)
POTASSIUM SERPL-MCNC: 3.7 MMOL/L — SIGNIFICANT CHANGE UP (ref 3.5–5.3)
POTASSIUM SERPL-SCNC: 3.7 MMOL/L — SIGNIFICANT CHANGE UP (ref 3.5–5.3)
PROT SERPL-MCNC: 7 G/DL — SIGNIFICANT CHANGE UP (ref 6–8.3)
PROTHROM AB SERPL-ACNC: 13 SEC — SIGNIFICANT CHANGE UP (ref 9.9–13.4)
RBC # BLD: 6.09 M/UL — HIGH (ref 4.2–5.8)
RBC # FLD: 13.2 % — SIGNIFICANT CHANGE UP (ref 10.3–14.5)
SODIUM SERPL-SCNC: 139 MMOL/L — SIGNIFICANT CHANGE UP (ref 135–145)
TROPONIN T, HIGH SENSITIVITY RESULT: <6 NG/L — SIGNIFICANT CHANGE UP
TROPONIN T, HIGH SENSITIVITY RESULT: <6 NG/L — SIGNIFICANT CHANGE UP
WBC # BLD: 8.02 K/UL — SIGNIFICANT CHANGE UP (ref 3.8–10.5)
WBC # FLD AUTO: 8.02 K/UL — SIGNIFICANT CHANGE UP (ref 3.8–10.5)

## 2025-04-27 PROCEDURE — 93010 ELECTROCARDIOGRAM REPORT: CPT | Mod: 76

## 2025-04-27 PROCEDURE — 99285 EMERGENCY DEPT VISIT HI MDM: CPT

## 2025-04-27 PROCEDURE — 71046 X-RAY EXAM CHEST 2 VIEWS: CPT | Mod: 26

## 2025-04-27 RX ORDER — ASPIRIN 325 MG
162 TABLET ORAL ONCE
Refills: 0 | Status: COMPLETED | OUTPATIENT
Start: 2025-04-27 | End: 2025-04-27

## 2025-04-27 RX ADMIN — Medication 162 MILLIGRAM(S): at 03:27

## 2025-04-27 NOTE — ED PROVIDER NOTE - CLINICAL SUMMARY MEDICAL DECISION MAKING FREE TEXT BOX
38 yo male with no medical history (only med is wegovy for past 1 year) comes into the ED for eval of sudden onset chest pain associated with lightheadedness that started around 1:30am. He states he was standing neck to his car speaking to a friend when he had sudden pain to the mid/right chest. He got in his car and he had some brief dizziness. He drove here to the ED. He did not have any associated diaphoresis, palpitations, nausea, vomiting, abd pain. He states he works as an [employer]  and he works for 20hours of the day and does not get enough sleep. He has not had any recent trauma, strain, new exercise. He reports no family hx of sudden death.     On exam, chest pain is reproducible with palpation at one point in the right side of the sternum. He has no pleuritic pain, normal sinus rhythm, lungs clear to auscultation, abd soft nontender. no exam findings concerning for DVT of the upper or lower extremities.

## 2025-04-27 NOTE — ED ADULT NURSE NOTE - OBJECTIVE STATEMENT
Received pt in room 14, pt is A&Ox4 with past medical history of on Wecovey for weight loss. Pt came to the ED due to having chest discomfort since around 1am. pt reports that he was leaving work when this started. Pt describes the pain as chest pressure to his mid sternum. Pt is NSR on cardiac monitor, pt breathing is equal and nonlabored. Pt abdomen is soft and nondistended. pt denies any SOB, headache, nausea, vomiting, diarrhea, fever or chills. Pt safety maintained

## 2025-04-27 NOTE — ED PROVIDER NOTE - PATIENT PORTAL LINK FT
You can access the FollowMyHealth Patient Portal offered by [facility] by registering at the following website: [email or web address]. By joining [employer]’s FollowMyHealth portal, you will also be able to view your health information using other applications (apps) compatible with our system.

## 2025-04-27 NOTE — ED ADULT NURSE REASSESSMENT NOTE - NS ED NURSE REASSESS COMMENT FT1
Break RN: Pt alert and oriented x 4, laying in bed, NAD, respirations even and unlabored, NSR on the monitor. 20G IV in the right AC, labs drawn and pt medicated per MD orders. Bed in lowest position, side rails upright, safety maintained.

## 2025-04-27 NOTE — ED ADULT TRIAGE NOTE - CHIEF COMPLAINT QUOTE
c/o R sided chest pain radiating down towards arm associated with SOB that began at ~0100. denies past medical  hx. denies taking any medications prior to arrival.    pt takes weekly shot of Wegovy. pt appears uncomfortable  in triage.

## 2025-04-27 NOTE — ED PROVIDER NOTE - CARE PROVIDER_API CALL
Jhonatan Marquis  Cardiac Electrophysiology  [address] [address] [place] [id number]  Phone: [phone]  Fax: [phone]  Follow Up Time: 4-6 Days

## 2025-04-27 NOTE — ED PROVIDER NOTE - PROGRESS NOTE DETAILS
Landon Green PGY3:  Will get 4hr rpt troponin and Pt will be seen by EP at 8am. Discussed with cardiology. If delta trop not requiring CDU/admission, EP to mail biopatch to patient for 30 day monitoring. hannah- spoke with cards NP guidice- case reviewed with dr hi KENDRICK, his office will reach out to pt to establish fu and biopatch arrangement. Pt currently asymptomatic, labs not actionable. given return precautions, tbdc

## 2025-04-27 NOTE — CONSULT NOTE ADULT - ASSESSMENT
39M no reported PMH p/w chest pain. Pain began suddenly when patting co-worker on shoulder and notes associated dizziness.  No reported history of early cardiac disease in family or sudden onset death.  EKG with Brugada type 2.  high lead ECG placement with type 1 pattern.        Noted incidentally to have spontaneous Brugada pattern on ECG no history of near/true syncope.      Recommendations:   -Pt w/ presumed Brugada syndrome;   -Should avoid potential triggers for arrhythmia such as: fever, sinus bradycardia, excess alcohol, cocaine, lithium, tricyclic antidepressants.  -Electrolytes should be normal, avoid hyperkalemia. Avoid hyperthermia, aggressively treat fevers  -Brugada syndrome is a cardiac channelopathy that is associated with polymorphic ventricular arrhythmias. Potassium and sodium channel blockers can bring out arrhythmia in these patients and must be avoided. Brugadadrugs.org is a web site that lists drugs that should be avoided.   -Troponin x2 to r/o acs      Thank you, if any questions or clinical situation changes please call spectra #[phone].  Case discussed with on call [place] fellow Dr. CAROL Lund   Attending Attestation to follow

## 2025-04-27 NOTE — CONSULT NOTE ADULT - SUBJECTIVE AND OBJECTIVE BOX
All Cardiology service information can be found 24/7 on [email or web address], password: [email or web address]    Patient seen and evaluated at bedside    HPI: 40 y/o male with no pmhx.  Takes wegovy for weight loss.  P/w sudden onset chest pain associated with lightheadedness that started around 1:30am.  Describes as sharp, non-radiating right sided/mid-chest pain.  No associated sob/difficulty breathing.  He states he was standing neck to his car speaking to a friend when he had sudden pain to the mid/right chest. He got in his car and he had some brief dizziness. He drove here to the ED. He did not have any associated diaphoresis, palpitations, nausea, vomiting, abd pain. He reports no family hx of sudden death.  Has previously seen a cardiologist for "arrythmia workup" about 7 years ago that was unremarkable.  Attributes symptoms to lack of sleep due to work.        Cardiac Meds:  none    PMHx:   No pertinent past medical history  Gout        PSHx:   No significant past surgical history        Allergies:  No Known Allergies      Current Medications:   wegovy     FAMILY HISTORY:  mother DM        REVIEW OF SYSTEMS:  CONSTITUTIONAL: No weakness, fevers or chills  EYES/ENT: No visual changes;  No dysphagia  NECK: No pain or stiffness  RESPIRATORY: No cough, wheezing, hemoptysis; No shortness of breath  CARDIOVASCULAR: + chest pain No palpitations; No lower extremity edema  GASTROINTESTINAL: No abdominal or epigastric pain. No nausea, vomiting, or hematemesis; No diarrhea or constipation. No melena or hematochezia.  BACK: No back pain  GENITOURINARY: No dysuria, frequency or hematuria  NEUROLOGICAL: No numbness or weakness  SKIN: No itching, burning, rashes, or lesions   All other review of systems is negative unless indicated above.    Physical Exam:  T(F): 97.7 (04-27), Max: 97.7 (04-27)  HR: 68 (04-27) (68 - 68)  BP: 135/86 (04-27) (135/86 - 135/86)  RR: 16 (04-27)  SpO2: 100% (04-27)  GEN: NAD  HEENT: EOMI, clear sclera  PULM: CTA b/l, no wheeze  CV: RRR S1 S2, no murmur, no JVD  ABD: S, NT, ND  EXT: WWP, no edema  PSYCH: normal affect  SKIN: No rash    CXR: Personally reviewed    Labs: Personally reviewed                        15.5   8.02  )-----------( 257      ( 27 Apr 2025 03:54 )             46.6     04-27    139  |  102  |  15  ----------------------------<  84  3.7   |  24  |  1.02    Ca    9.5      27 Apr 2025 03:54  Mg     2.00     04-27    TPro  7.0  /  Alb  4.0  /  TBili  0.2  /  DBili  x   /  AST  22  /  ALT  35  /  AlkPhos  136[H]  04-27    PT/INR - ( 27 Apr 2025 03:54 )   PT: 13.0 sec;   INR: 1.12 ratio         PTT - ( 27 Apr 2025 03:54 )  PTT:34.7 sec    CARDIAC MARKERS ( 27 Apr 2025 03:54 )  <6 ng/L / x     / x     / x     / x     / x

## 2025-04-27 NOTE — ED PROVIDER NOTE - ATTENDING CONTRIBUTION TO CARE
39M no reported PMH p/w chest pain. Pain began suddenly when patting co-worker on shoulder and notes associated dizziness. Began approx 130am and has continued. Describes as sharp, non-radiating right sided/mid-chest pain. No associated sob/difficulty breathing. No reported history of early cardiac disease in family or sudden onset death.   GEN: awake and alert, non-toxic appearing  CV: rrr, no appreciable murmur; reproducible leidy-sternal chest pain with palpation  PULM: clear lungs b/l  ABD: soft, ntnd  EXT: no edema/swelling  SKIN: no rash/petechiae/ecchymosis  MDM: 39M no reported PMH p/w chest pain. Low suspicion for ACS based on report of symptoms, though EKG appears c/w Brugada type 3. Will check electrolytes and troponin. Will need cards assessment. Obtain CXR though low suspicion for acute rib injury or lung pathology.

## 2025-04-27 NOTE — ED PROVIDER NOTE - NSFOLLOWUPINSTRUCTIONS_ED_ALL_ED_FT
You were seen in the hospital and found to have an irregularity on your EKG concerning for Brugada syndrome.  because of this he needs to follow-up with an electrophysiologist.  Information for our electrophysiology clinic attached below.  If you have palpitations worsening chest pain or passout you should return the emergency department to be reevaluated.        See this website for a list of medications to avoid with this particular disorder   [email or web address]

## 2025-08-07 ENCOUNTER — APPOINTMENT (OUTPATIENT)
Dept: ORTHOPEDIC SURGERY | Facility: CLINIC | Age: 40
End: 2025-08-07

## 2025-09-04 ENCOUNTER — APPOINTMENT (OUTPATIENT)
Dept: ORTHOPEDIC SURGERY | Facility: CLINIC | Age: 40
End: 2025-09-04